# Patient Record
Sex: FEMALE | Race: WHITE | NOT HISPANIC OR LATINO | Employment: UNEMPLOYED | URBAN - METROPOLITAN AREA
[De-identification: names, ages, dates, MRNs, and addresses within clinical notes are randomized per-mention and may not be internally consistent; named-entity substitution may affect disease eponyms.]

---

## 2019-04-15 ENCOUNTER — APPOINTMENT (OUTPATIENT)
Dept: ADMISSIONS | Facility: MEDICAL CENTER | Age: 59
End: 2019-04-15
Attending: INTERNAL MEDICINE
Payer: MEDICAID

## 2019-04-18 ENCOUNTER — ANESTHESIA (OUTPATIENT)
Dept: SURGERY | Facility: MEDICAL CENTER | Age: 59
End: 2019-04-18
Payer: MEDICAID

## 2019-04-18 ENCOUNTER — HOSPITAL ENCOUNTER (OUTPATIENT)
Facility: MEDICAL CENTER | Age: 59
End: 2019-04-18
Attending: INTERNAL MEDICINE | Admitting: INTERNAL MEDICINE
Payer: MEDICAID

## 2019-04-18 ENCOUNTER — ANESTHESIA EVENT (OUTPATIENT)
Dept: SURGERY | Facility: MEDICAL CENTER | Age: 59
End: 2019-04-18
Payer: MEDICAID

## 2019-04-18 ENCOUNTER — APPOINTMENT (OUTPATIENT)
Dept: RADIOLOGY | Facility: MEDICAL CENTER | Age: 59
End: 2019-04-18
Attending: INTERNAL MEDICINE
Payer: MEDICAID

## 2019-04-18 VITALS
OXYGEN SATURATION: 98 % | TEMPERATURE: 97.1 F | HEIGHT: 65 IN | WEIGHT: 129.19 LBS | DIASTOLIC BLOOD PRESSURE: 72 MMHG | BODY MASS INDEX: 21.52 KG/M2 | SYSTOLIC BLOOD PRESSURE: 112 MMHG | RESPIRATION RATE: 18 BRPM

## 2019-04-18 PROCEDURE — 700111 HCHG RX REV CODE 636 W/ 250 OVERRIDE (IP): Performed by: ANESTHESIOLOGY

## 2019-04-18 PROCEDURE — 700101 HCHG RX REV CODE 250: Performed by: INTERNAL MEDICINE

## 2019-04-18 PROCEDURE — 160048 HCHG OR STATISTICAL LEVEL 1-5: Performed by: INTERNAL MEDICINE

## 2019-04-18 PROCEDURE — 160036 HCHG PACU - EA ADDL 30 MINS PHASE I: Performed by: INTERNAL MEDICINE

## 2019-04-18 PROCEDURE — 700105 HCHG RX REV CODE 258: Performed by: ANESTHESIOLOGY

## 2019-04-18 PROCEDURE — 160046 HCHG PACU - 1ST 60 MINS PHASE II: Performed by: INTERNAL MEDICINE

## 2019-04-18 PROCEDURE — 160025 RECOVERY II MINUTES (STATS): Performed by: INTERNAL MEDICINE

## 2019-04-18 PROCEDURE — 160009 HCHG ANES TIME/MIN: Performed by: INTERNAL MEDICINE

## 2019-04-18 PROCEDURE — 160208 HCHG ENDO MINUTES - EA ADDL 1 MIN LEVEL 4: Performed by: INTERNAL MEDICINE

## 2019-04-18 PROCEDURE — 500066 HCHG BITE BLOCK, ECT: Performed by: INTERNAL MEDICINE

## 2019-04-18 PROCEDURE — 160035 HCHG PACU - 1ST 60 MINS PHASE I: Performed by: INTERNAL MEDICINE

## 2019-04-18 PROCEDURE — 700101 HCHG RX REV CODE 250: Performed by: ANESTHESIOLOGY

## 2019-04-18 PROCEDURE — 160203 HCHG ENDO MINUTES - 1ST 30 MINS LEVEL 4: Performed by: INTERNAL MEDICINE

## 2019-04-18 PROCEDURE — 700105 HCHG RX REV CODE 258: Performed by: INTERNAL MEDICINE

## 2019-04-18 PROCEDURE — 160002 HCHG RECOVERY MINUTES (STAT): Performed by: INTERNAL MEDICINE

## 2019-04-18 RX ORDER — DEXAMETHASONE SODIUM PHOSPHATE 4 MG/ML
INJECTION, SOLUTION INTRA-ARTICULAR; INTRALESIONAL; INTRAMUSCULAR; INTRAVENOUS; SOFT TISSUE PRN
Status: DISCONTINUED | OUTPATIENT
Start: 2019-04-18 | End: 2019-04-18 | Stop reason: SURG

## 2019-04-18 RX ORDER — MINOCYCLINE HYDROCHLORIDE 100 MG/1
100 TABLET ORAL EVERY MORNING
COMMUNITY

## 2019-04-18 RX ORDER — ONDANSETRON 2 MG/ML
4 INJECTION INTRAMUSCULAR; INTRAVENOUS
Status: COMPLETED | OUTPATIENT
Start: 2019-04-18 | End: 2019-04-18

## 2019-04-18 RX ORDER — HALOPERIDOL 5 MG/ML
1 INJECTION INTRAMUSCULAR
Status: DISCONTINUED | OUTPATIENT
Start: 2019-04-18 | End: 2019-04-18 | Stop reason: HOSPADM

## 2019-04-18 RX ORDER — SODIUM CHLORIDE, SODIUM LACTATE, POTASSIUM CHLORIDE, CALCIUM CHLORIDE 600; 310; 30; 20 MG/100ML; MG/100ML; MG/100ML; MG/100ML
INJECTION, SOLUTION INTRAVENOUS CONTINUOUS
Status: DISCONTINUED | OUTPATIENT
Start: 2019-04-18 | End: 2019-04-18 | Stop reason: HOSPADM

## 2019-04-18 RX ORDER — DIPHENHYDRAMINE HYDROCHLORIDE 50 MG/ML
12.5 INJECTION INTRAMUSCULAR; INTRAVENOUS
Status: DISCONTINUED | OUTPATIENT
Start: 2019-04-18 | End: 2019-04-18 | Stop reason: HOSPADM

## 2019-04-18 RX ORDER — ONDANSETRON 2 MG/ML
INJECTION INTRAMUSCULAR; INTRAVENOUS PRN
Status: DISCONTINUED | OUTPATIENT
Start: 2019-04-18 | End: 2019-04-18 | Stop reason: SURG

## 2019-04-18 RX ORDER — MEPERIDINE HYDROCHLORIDE 25 MG/ML
6.25 INJECTION INTRAMUSCULAR; INTRAVENOUS; SUBCUTANEOUS
Status: DISCONTINUED | OUTPATIENT
Start: 2019-04-18 | End: 2019-04-18 | Stop reason: HOSPADM

## 2019-04-18 RX ADMIN — SODIUM CHLORIDE, POTASSIUM CHLORIDE, SODIUM LACTATE AND CALCIUM CHLORIDE: 600; 310; 30; 20 INJECTION, SOLUTION INTRAVENOUS at 09:43

## 2019-04-18 RX ADMIN — ONDANSETRON 4 MG: 2 INJECTION INTRAMUSCULAR; INTRAVENOUS at 09:10

## 2019-04-18 RX ADMIN — SODIUM CHLORIDE, POTASSIUM CHLORIDE, SODIUM LACTATE AND CALCIUM CHLORIDE: 600; 310; 30; 20 INJECTION, SOLUTION INTRAVENOUS at 08:16

## 2019-04-18 RX ADMIN — PROPOFOL 150 MG: 10 INJECTION, EMULSION INTRAVENOUS at 09:00

## 2019-04-18 RX ADMIN — SUCCINYLCHOLINE CHLORIDE 60 MG: 20 INJECTION, SOLUTION INTRAMUSCULAR; INTRAVENOUS at 09:00

## 2019-04-18 RX ADMIN — LIDOCAINE HYDROCHLORIDE 0.5 ML: 10 INJECTION, SOLUTION EPIDURAL; INFILTRATION; INTRACAUDAL; PERINEURAL at 08:10

## 2019-04-18 RX ADMIN — ONDANSETRON 4 MG: 2 INJECTION INTRAMUSCULAR; INTRAVENOUS at 09:42

## 2019-04-18 RX ADMIN — DEXAMETHASONE SODIUM PHOSPHATE 4 MG: 4 INJECTION, SOLUTION INTRA-ARTICULAR; INTRALESIONAL; INTRAMUSCULAR; INTRAVENOUS; SOFT TISSUE at 09:10

## 2019-04-18 RX ADMIN — MEPERIDINE HYDROCHLORIDE 6.25 MG: 25 INJECTION INTRAMUSCULAR; INTRAVENOUS; SUBCUTANEOUS at 09:58

## 2019-04-18 RX ADMIN — MEPERIDINE HYDROCHLORIDE 6.25 MG: 25 INJECTION INTRAMUSCULAR; INTRAVENOUS; SUBCUTANEOUS at 09:50

## 2019-04-18 ASSESSMENT — PAIN SCALES - GENERAL: PAIN_LEVEL: 0

## 2019-04-18 NOTE — ANESTHESIA POSTPROCEDURE EVALUATION
Patient: Halina Abbott    Procedure Summary     Date:  04/18/19 Room / Location:  [place] [id number] / SURGERY [facility]    Anesthesia Start:  0852 Anesthesia Stop:  0929    Procedure:  ERCP (ENDOSCOPIC RETROGRADE CHOLANGIOPANCREATOGRAPHY) (N/A Mouth) Diagnosis:  (CBD stone)    Surgeon:  Donald Cristina M.D. Responsible Provider:  Rajan Palacios M.D.    Anesthesia Type:  general ASA Status:  2          Final Anesthesia Type: general  Last vitals  BP   Blood Pressure: 126/86, NIBP: 113/72    Temp   36.1 °C (97 °F)    Pulse   Heart Rate (Monitored): (!) 58   Resp   18    SpO2   99 %      Anesthesia Post Evaluation    Patient location during evaluation: PACU  Patient participation: complete - patient participated  Level of consciousness: awake and alert  Pain score: 0    Airway patency: patent  Anesthetic complications: no  Cardiovascular status: hemodynamically stable  Respiratory status: acceptable  Hydration status: euvolemic    PONV: none           Nurse Pain Score: 0 (NPRS)

## 2019-04-18 NOTE — PROGRESS NOTES
Pt's VSS, pt still in recliner, pt declining any water/clear liquids, she states she wants to wait until she is home

## 2019-04-18 NOTE — PROCEDURES
DATE OF SERVICE:  04/18/2019    PROCEDURES:  Endoscopic retrograde cholangiopancreatography with:  1.  Biliary sphincterotomy.  2.  Bile duct balloon stone extraction.  3.  Radiologic interpretation of static and dynamic fluoroscopic images by   myself, at no time was radiologist present in the room.    INDICATION:  Choledocholithiasis with elevated liver enzymes.    FINAL IMPRESSION:  1.  Biliary ampulla, unremarkable appearance.  2.  Pancreatic duct neither injected nor cannulated even once.  3.  Common bile duct cannulated on first attempt with wire.  Normal course.  A   12 mm diameter.  Two large stones seen, estimated at 10 mm x 15 mm and 10 mm   x 12 mm.    THERAPEUTICS:  1.  A 10 mm biliary sphincterotomy performed with bow papillotome over covered   wire.  2.  Bile duct balloon stone extraction productive of 2 large black stones,   sludge and debris.    RECOMMENDATIONS:  1.  Follow liver enzymes.  2.  Clear diet today, then advance tomorrow as tolerated.  3.  [facility]    DESCRIPTION OF PROCEDURE:  Prior to the procedure, physical exam was stable.    During procedure, vital signs remained within normal limits.  Prior to   sedation, informed consent was obtained.  Risks, benefits, alternatives   including but not limited to risk of bleeding, infection, perforation, adverse   reaction to medication, failure to identify pathology, pancreatitis and death   explained at length to the patient, she accepted all risks.  Patient was   prepped in the prone position after intubation and sedation by anesthesia.    Scope tip of the Olympus flexible side viewing TJF duodenoscope passed to   level of the biliary ampulla in the short position after the gastric pool was   suctioned dry.  Biliary ampulla was unremarkable in appearance.  [place]   papillotome with a covered wire was utilized for selective cannulation of the   common bile duct, this was achieved on the very first attempt and the  wire   passed along the expected course of the bile duct.  At no time was the   pancreatic duct injected, cannulated, or otherwise manipulated.  Initially,   aspiration was positive for bile.  Initially, injection of contrast showed a   low cystic duct takeoff and the wire was in the cystic duct.  The wire was   repositioned in the common bile duct.  Further injection of contrast   demonstrated dilated bile duct throughout its extrahepatic course, the   intrahepatic biliary tree was unremarkable.  In the distal duct, there were 2   stones estimated at 10 mm x 12 mm and 10 mm x 15 mm.  There was no evidence of   mass.  Wire was left in place in the left intrahepatic biliary tree and a 10   mm biliary sphincterotomy was performed with the bow papillotome over covered   wire in the usual fashion.  Sludge and small stone debris was seen flowing   from the duct.  The wire was left in place and a 9-12 mm extraction balloon   was exchanged over the wire and passed between the 2 large stones.  This was   inflated to 12 mm and pulled down the duct with delivery of one of the stones.    The balloon pulled easily through the sphincterotomy site.  The balloon was   deflated and passed proximal to the second stone, inflated in a similar   manner, the stone was extracted.  Further sweeps x3 were made from the common   hepatic duct, first sweep was productive of small stone debris and sludge, but   an additional 2 sweeps were completely negative.  Repeat cholangiogram was   unremarkable.  Procedure was deemed complete.  The scope was withdrawn.  Air   and liquid were suctioned.  Patient tolerated the procedure well and was sent   to recovery without immediate complications.       ____________________________________     MD JOSELIN ARREOLA / VANE    DD:  04/18/2019 09:31:08  DT:  04/18/2019 09:48:42    D#:  [id number]  Job#:  [id number]

## 2019-04-18 NOTE — OR NURSING
Pt A&OX4. VSS. Pt on room air. Pt has no complaints of pain. IV Zofran administered for nausea, resolved. Pt declined sips of water. IV Demerol administered for shivering, resolved. No scripts. Voicemail left for pt's Fabiana hernandez. Report called to GAIL Gunn. Pt out of PACU to Phase II via amadeo.

## 2019-04-18 NOTE — ANESTHESIA TIME REPORT
Anesthesia Start and Stop Event Times     Date Time Event    4/18/2019 0852 Anesthesia Start     0929 Anesthesia Stop        Responsible Staff  04/18/19    Name Role Begin End    Rajan Palacios M.D. Anesth 0852 0996        Preop Diagnosis (Free Text):  Pre-op Diagnosis     CALCULUS OF BILE DUCT WITHOUT CHOLANGITIS OR CHOLECYSTITIS        Preop Diagnosis (Codes):  Diagnosis Information     Diagnosis Code(s):         Post op Diagnosis  Calculus of bile duct without cholangitis or cholecystitis      Premium Reason  Non-Premium    Comments:

## 2019-04-18 NOTE — ANESTHESIA QCDR
2019 [facility] Clinical Data Registry (for Quality Improvement)     Postoperative nausea/vomiting risk protocol (Adult = 18 yrs and Pediatric 3-17 yrs)- (430 and 463)  General inhalation anesthetic (NOT TIVA) with PONV risk factors: Yes  Provision of anti-emetic therapy with at least 2 different classes of agents: Yes   Patient DID NOT receive anti-emetic therapy and reason is documented in Medical Record:  N/A    Multimodal Pain Management- (AQI59)  Patient undergoing Elective Surgery (i.e. Outpatient, or ASC, or Prescheduled Surgery prior to Hospital Admission): No  Use of Multimodal Pain Management, two or more drugs and/or interventions, NOT including systemic opioids: N/A  Exception: Documented allergy to multiple classes of analgesics: N/A    PACU assessment of acute postoperative pain prior to Anesthesia Care End- Applies to Patients Age = 18- (ABG7)  Initial PACU pain score is which of the following: < 7/10  Patient unable to report pain score: N/A    Post-anesthetic transfer of care checklist/protocol to PACU/ICU- (426 and 427)  Upon conclusion of case, patient transferred to which of the following locations: PACU/Non-ICU  Use of transfer checklist/protocol: Yes  Exclusion: Service Performed in Patient Hospital Room (and thus did not require transfer): N/A    PACU Reintubation- (AQI31)  General anesthesia requiring endotracheal intubation (ETT) along with subsequent extubation in OR or PACU: Yes  Required reintubation in the PACU: No   Extubation was a planned trial documented in the medical record prior to removal of the original airway device:  N/A    Unplanned admission to ICU related to anesthesia service up through end of PACU care- (MD51)  Unplanned admission to ICU (not initially anticipated at anesthesia start time): No

## 2019-04-18 NOTE — ANESTHESIA PROCEDURE NOTES
Airway  Date/Time: 4/18/2019 9:00 AM  Performed by: MERLENE DE LOS SANTOS  Authorized by: MERLENE DE LOS SANTOS     Location:  OR  Urgency:  Elective  Indications for Airway Management:  Anesthesia  Spontaneous Ventilation: absent    Sedation Level:  Deep  Preoxygenated: Yes    Patient Position:  Sniffing  Mask Difficulty Assessment:  0 - not attempted  Final Airway Type:  Endotracheal airway  Final Endotracheal Airway:  ETT  Cuffed: Yes    Technique Used for Successful ETT Placement:  Video laryngoscopy  Insertion Site:  Oral  Blade Type:  Marta (GS3)  Laryngoscope Blade/Videolaryngoscope Blade Size:  3  ETT Size (mm):  7.0  Measured from:  Teeth  ETT to Teeth (cm):  20  Placement Verified by: auscultation and capnometry    Cormack-Lehane Classification:  Grade I - full view of glottis  Number of Attempts at Approach:  1   Teeth intact, chose GS given extensive front teeth damage and risk

## 2019-04-18 NOTE — ANESTHESIA PREPROCEDURE EVALUATION
Relevant Problems   (+) Anxiety   (+) Depression   (+) Fibromyalgia   (+) Lumbar disc disease   (+) Migraines   (+) Peptic ulcer disease       Physical Exam    Airway   Mallampati: II  TM distance: >3 FB  Neck ROM: full       Cardiovascular - normal exam  Rhythm: regular  Rate: normal  (-) murmur     Dental - normal exam        Very poor dentition   Pulmonary - normal exam  Breath sounds clear to auscultation     Abdominal    Neurological - normal exam                 Anesthesia Plan    ASA 2       Plan - general       Airway plan will be ETT        Induction: intravenous      Pertinent diagnostic labs and testing reviewed    Informed Consent:    Anesthetic plan and risks discussed with patient.

## 2019-04-18 NOTE — PROGRESS NOTES
Pre op assessment complete, pt's VSS, pt updated on POC. Call light within reach, pt denies any other needs at this time. Pt given blanket and turned on bill paws, lights lowered, no further needs requested at this time

## 2019-04-18 NOTE — PROGRESS NOTES
"Pt ambulated to bathroom and voided, pt has steady gait but requested RN assistance \"just in case\"  "

## 2019-04-18 NOTE — OR SURGEON
Immediate Post OP Note    PreOp Diagnosis: Bile duct obstruction    PostOp Diagnosis: Same - CBD stones    Procedure(s):  ERCP (ENDOSCOPIC RETROGRADE CHOLANGIOPANCREATOGRAPHY) - Wound Class: Clean Contaminated    Surgeon(s):  Donald Cristina M.D.    Anesthesiologist/Type of Anesthesia:  Anesthesiologist: Rajan Palacios M.D./General    Surgical Staff:  Circulator: Marguerite Argueta R.N.  Endoscopy Technician: Jennifer Rincon  Radiology Technologist: Tanya Galvin    Specimens removed if any:  * No specimens in log *    Dr. Cristina  [facility]  [place]  [phone]    ERCP with: Biliary sphincterotomy    Bile duct balloon stone extraction    Radiologic interpretation of static and dynamic fluoroscopic images    Indication: Choledocolithiasis    Sedation: GA (Garol)    Findings:   ERCP    Ampulla     Unremarkable    Pancreatic duct     Neither injected nor cannulated    CBD     Cannulated on 1st attempt with wire     Normal course     12mm diameter     2 stones - 10mm x 15mm, 10mm x 12mm     Therapeutics    10mm biliary sphincterotomy with bow papillatome over covered wire    Bile duct balloon stone extraction - 2 large black stones    Plan:  Follow liver enzymes    Clear diet today and advance tomorrow as tolerated    Follow up in clinic with Dr. Gee      4/18/2019 9:23 AM Donald Cristina M.D.

## 2019-04-18 NOTE — PROGRESS NOTES
Pt verbalizing request to go home, pt's VSS.     Discharge instructions discussed with patient, all questions answered. Discharge instructions, prescriptions and personal belongings with patient.

## 2019-04-18 NOTE — DISCHARGE INSTRUCTIONS
ACTIVITY: Rest and take it easy for the first 24 hours.  A responsible adult is recommended to remain with you during that time.  It is normal to feel sleepy.  We encourage you to not do anything that requires balance, judgment or coordination.    MILD FLU-LIKE SYMPTOMS ARE NORMAL. YOU MAY EXPERIENCE GENERALIZED MUSCLE ACHES, THROAT IRRITATION, HEADACHE AND/OR SOME NAUSEA.    FOR 24 HOURS DO NOT:  Drive, operate machinery or run household appliances.  Drink beer or alcoholic beverages.   Make important decisions or sign legal documents.    SPECIAL INSTRUCTIONS:  Patient not to drive today.   Resume pre procedural diet.    DIET: To avoid nausea, slowly advance diet as tolerated, avoiding spicy or greasy foods for the first day.  Add more substantial food to your diet according to your physician's instructions. INCREASE FLUIDS AND FIBER TO AVOID CONSTIPATION.    SURGICAL DRESSING/BATHING:  No restrictions on bathing    FOLLOW-UP APPOINTMENT:  A follow-up appointment should be arranged with your doctor in 1-2 weeks; call to schedule.    You should CALL YOUR PHYSICIAN if you develop:  Fever greater than 101 degrees F.  Pain not relieved by medication, or persistent nausea or vomiting.  Excessive bleeding (blood soaking through dressing) or unexpected drainage from the wound.  Extreme redness or swelling around the incision site, drainage of pus or foul smelling drainage.  Inability to urinate or empty your bladder within 8 hours.  Problems with breathing or chest pain.    You should call 911 if you develop problems with breathing or chest pain.  If you are unable to contact your doctor or surgical center, you should go to the nearest emergency room or urgent care center.  Physician's telephone #: Dr. Cristina [phone]    If any questions arise, call your doctor.  If your doctor is not available, please feel free to call the Surgical Center at [phone].  The Center is open Monday through Friday from 7AM to 7PM.   You can also call the HEALTH HOTLINE open 24 hours/day, 7 days/week and speak to a nurse at [phone], or toll free at [phone].    A registered nurse may call you a few days after your surgery to see how you are doing after your procedure.    MEDICATIONS: Resume taking daily medication.  Take prescribed pain medication with food.  If no medication is prescribed, you may take non-aspirin pain medication if needed.  PAIN MEDICATION CAN BE VERY CONSTIPATING.  Take a stool softener or laxative such as senokot, pericolace, or milk of magnesia if needed.    No prescriptions prescribed.  No pain medication given in recovery.    If your physician has prescribed pain medication that includes Acetaminophen (Tylenol), do not take additional Acetaminophen (Tylenol) while taking the prescribed medication.    Depression / Suicide Risk    As you are discharged from this [facility] facility, it is important to learn how to keep safe from harming yourself.    Recognize the warning signs:  · Abrupt changes in personality, positive or negative- including increase in energy   · Giving away possessions  · Change in eating patterns- significant weight changes-  positive or negative  · Change in sleeping patterns- unable to sleep or sleeping all the time   · Unwillingness or inability to communicate  · Depression  · Unusual sadness, discouragement and loneliness  · Talk of wanting to die  · Neglect of personal appearance   · Rebelliousness- reckless behavior  · Withdrawal from people/activities they love  · Confusion- inability to concentrate     If you or a loved one observes any of these behaviors or has concerns about self-harm, here's what you can do:  · Talk about it- your feelings and reasons for harming yourself  · Remove any means that you might use to hurt yourself (examples: pills, rope, extension cords, firearm)  · Get professional help from the community (Mental Health, Substance Abuse, psychological  counseling)  · Do not be alone:Call your Safe Contact- someone whom you trust who will be there for you.  · Call your local CRISIS HOTLINE 049-6168 or 510-082-9934  · Call your local Children's Mobile Crisis Response Team [place] (174) 305-4193 or www.SCYFIX  · Call the toll free National Suicide Prevention Hotlines   · National Suicide Prevention Lifeline 534-426-UMGJ (3062)  · National Hope Line Network 800-SUICIDE (143-8395)

## 2020-04-13 ENCOUNTER — HOSPITAL ENCOUNTER (INPATIENT)
Dept: HOSPITAL 8 - ED | Age: 60
LOS: 15 days | Discharge: HOME | DRG: 241 | End: 2020-04-28
Attending: FAMILY MEDICINE | Admitting: INTERNAL MEDICINE
Payer: MEDICAID

## 2020-04-13 VITALS — WEIGHT: 166.23 LBS | BODY MASS INDEX: 27.7 KG/M2 | HEIGHT: 65 IN

## 2020-04-13 VITALS — DIASTOLIC BLOOD PRESSURE: 53 MMHG | SYSTOLIC BLOOD PRESSURE: 89 MMHG

## 2020-04-13 VITALS — SYSTOLIC BLOOD PRESSURE: 68 MMHG | DIASTOLIC BLOOD PRESSURE: 32 MMHG

## 2020-04-13 VITALS — SYSTOLIC BLOOD PRESSURE: 89 MMHG | DIASTOLIC BLOOD PRESSURE: 52 MMHG

## 2020-04-13 VITALS — SYSTOLIC BLOOD PRESSURE: 89 MMHG | DIASTOLIC BLOOD PRESSURE: 53 MMHG

## 2020-04-13 VITALS — DIASTOLIC BLOOD PRESSURE: 35 MMHG | SYSTOLIC BLOOD PRESSURE: 78 MMHG

## 2020-04-13 VITALS — DIASTOLIC BLOOD PRESSURE: 34 MMHG | SYSTOLIC BLOOD PRESSURE: 74 MMHG

## 2020-04-13 VITALS — SYSTOLIC BLOOD PRESSURE: 83 MMHG | DIASTOLIC BLOOD PRESSURE: 43 MMHG

## 2020-04-13 VITALS — SYSTOLIC BLOOD PRESSURE: 84 MMHG | DIASTOLIC BLOOD PRESSURE: 37 MMHG

## 2020-04-13 VITALS — SYSTOLIC BLOOD PRESSURE: 90 MMHG | DIASTOLIC BLOOD PRESSURE: 54 MMHG

## 2020-04-13 VITALS — SYSTOLIC BLOOD PRESSURE: 85 MMHG | DIASTOLIC BLOOD PRESSURE: 58 MMHG

## 2020-04-13 VITALS — SYSTOLIC BLOOD PRESSURE: 85 MMHG | DIASTOLIC BLOOD PRESSURE: 50 MMHG

## 2020-04-13 VITALS — SYSTOLIC BLOOD PRESSURE: 72 MMHG | DIASTOLIC BLOOD PRESSURE: 39 MMHG

## 2020-04-13 VITALS — DIASTOLIC BLOOD PRESSURE: 37 MMHG | SYSTOLIC BLOOD PRESSURE: 80 MMHG

## 2020-04-13 DIAGNOSIS — K70.31: ICD-10-CM

## 2020-04-13 DIAGNOSIS — K75.4: ICD-10-CM

## 2020-04-13 DIAGNOSIS — R57.1: ICD-10-CM

## 2020-04-13 DIAGNOSIS — I85.10: ICD-10-CM

## 2020-04-13 DIAGNOSIS — N17.0: ICD-10-CM

## 2020-04-13 DIAGNOSIS — E87.1: ICD-10-CM

## 2020-04-13 DIAGNOSIS — D68.9: ICD-10-CM

## 2020-04-13 DIAGNOSIS — L30.9: ICD-10-CM

## 2020-04-13 DIAGNOSIS — K70.11: ICD-10-CM

## 2020-04-13 DIAGNOSIS — E87.6: ICD-10-CM

## 2020-04-13 DIAGNOSIS — Z83.3: ICD-10-CM

## 2020-04-13 DIAGNOSIS — I95.89: ICD-10-CM

## 2020-04-13 DIAGNOSIS — Z98.51: ICD-10-CM

## 2020-04-13 DIAGNOSIS — E43: ICD-10-CM

## 2020-04-13 DIAGNOSIS — D50.9: ICD-10-CM

## 2020-04-13 DIAGNOSIS — H10.029: ICD-10-CM

## 2020-04-13 DIAGNOSIS — D62: ICD-10-CM

## 2020-04-13 DIAGNOSIS — K76.6: ICD-10-CM

## 2020-04-13 DIAGNOSIS — J90: ICD-10-CM

## 2020-04-13 DIAGNOSIS — Z82.49: ICD-10-CM

## 2020-04-13 DIAGNOSIS — K26.4: Primary | ICD-10-CM

## 2020-04-13 DIAGNOSIS — Z79.899: ICD-10-CM

## 2020-04-13 DIAGNOSIS — E27.40: ICD-10-CM

## 2020-04-13 DIAGNOSIS — R56.9: ICD-10-CM

## 2020-04-13 DIAGNOSIS — Z51.5: ICD-10-CM

## 2020-04-13 DIAGNOSIS — L89.152: ICD-10-CM

## 2020-04-13 LAB
<PLATELET ESTIMATE>: ADEQUATE
<PLT MORPHOLOGY>: (no result)
ALBUMIN SERPL-MCNC: 1.4 G/DL (ref 3.4–5)
ALP SERPL-CCNC: 385 U/L (ref 45–117)
ALT SERPL-CCNC: 32 U/L (ref 12–78)
ANION GAP SERPL CALC-SCNC: 10 MMOL/L (ref 5–15)
ANISOCYTOSIS BLD QL SMEAR: (no result)
BAND#(MANUAL): 0.11 X10^3/UL
BILIRUB SERPL-MCNC: 11.9 MG/DL (ref 0.2–1)
CALCIUM SERPL-MCNC: 8 MG/DL (ref 8.5–10.1)
CHLORIDE SERPL-SCNC: 93 MMOL/L (ref 98–107)
CREAT SERPL-MCNC: 1.34 MG/DL (ref 0.55–1.02)
ERYTHROCYTE [DISTWIDTH] IN BLOOD BY AUTOMATED COUNT: 18 % (ref 9.6–15.2)
HYPOCHROMIA BLD QL SMEAR: (no result)
INR PPP: 1.24 (ref 0.93–1.1)
LYMPH#(MANUAL): 0.84 X10^3/UL (ref 1–3.4)
LYMPHS% (MANUAL): 8 % (ref 22–44)
MACROCYTES BLD QL SMEAR: (no result)
MCH RBC QN AUTO: 33.3 PG (ref 27–34.8)
MCHC RBC AUTO-ENTMCNC: 33.3 G/DL (ref 32.4–35.8)
MCV RBC AUTO: 100.1 FL (ref 80–100)
MD: YES
MONOS#(MANUAL): 0.53 X10^3/UL (ref 0.3–2.7)
MONOS% (MANUAL): 5 % (ref 2–9)
NEUTS BAND NFR BLD: 1 % (ref 0–7)
PLATELET # BLD AUTO: 301 X10^3/UL (ref 130–400)
PMV BLD AUTO: 6.9 FL (ref 7.4–10.4)
POLYCHROMASIA BLD QL SMEAR: (no result)
PROT SERPL-MCNC: 4.7 G/DL (ref 6.4–8.2)
PROTHROMBIN TIME: 13.2 SECONDS (ref 9.6–11.5)
RBC # BLD AUTO: 1.55 X10^6/UL (ref 3.82–5.3)
SEG#(MANUAL): 9.03 X10^3/UL (ref 1.8–6.8)
SEGS% (MANUAL): 86 % (ref 42–75)
STOMATOCYTES BLD QL SMEAR: (no result)

## 2020-04-13 PROCEDURE — A9575 INJ GADOTERATE MEGLUMI 0.1ML: HCPCS

## 2020-04-13 PROCEDURE — 93005 ELECTROCARDIOGRAM TRACING: CPT

## 2020-04-13 PROCEDURE — 49083 ABD PARACENTESIS W/IMAGING: CPT

## 2020-04-13 PROCEDURE — 87205 SMEAR GRAM STAIN: CPT

## 2020-04-13 PROCEDURE — 36556 INSERT NON-TUNNEL CV CATH: CPT

## 2020-04-13 PROCEDURE — 30233N1 TRANSFUSION OF NONAUTOLOGOUS RED BLOOD CELLS INTO PERIPHERAL VEIN, PERCUTANEOUS APPROACH: ICD-10-PCS | Performed by: FAMILY MEDICINE

## 2020-04-13 PROCEDURE — P9016 RBC LEUKOCYTES REDUCED: HCPCS

## 2020-04-13 PROCEDURE — 96375 TX/PRO/DX INJ NEW DRUG ADDON: CPT

## 2020-04-13 PROCEDURE — P9047 ALBUMIN (HUMAN), 25%, 50ML: HCPCS

## 2020-04-13 PROCEDURE — 96374 THER/PROPH/DIAG INJ IV PUSH: CPT

## 2020-04-13 PROCEDURE — 82962 GLUCOSE BLOOD TEST: CPT

## 2020-04-13 PROCEDURE — 87040 BLOOD CULTURE FOR BACTERIA: CPT

## 2020-04-13 PROCEDURE — 85018 HEMOGLOBIN: CPT

## 2020-04-13 PROCEDURE — 85610 PROTHROMBIN TIME: CPT

## 2020-04-13 PROCEDURE — 70450 CT HEAD/BRAIN W/O DYE: CPT

## 2020-04-13 PROCEDURE — 87070 CULTURE OTHR SPECIMN AEROBIC: CPT

## 2020-04-13 PROCEDURE — 83605 ASSAY OF LACTIC ACID: CPT

## 2020-04-13 PROCEDURE — 85025 COMPLETE CBC W/AUTO DIFF WBC: CPT

## 2020-04-13 PROCEDURE — 86902 BLOOD TYPE ANTIGEN DONOR EA: CPT

## 2020-04-13 PROCEDURE — 83880 ASSAY OF NATRIURETIC PEPTIDE: CPT

## 2020-04-13 PROCEDURE — C9113 INJ PANTOPRAZOLE SODIUM, VIA: HCPCS

## 2020-04-13 PROCEDURE — 87338 HPYLORI STOOL AG IA: CPT

## 2020-04-13 PROCEDURE — 95812 EEG 41-60 MINUTES: CPT

## 2020-04-13 PROCEDURE — 82140 ASSAY OF AMMONIA: CPT

## 2020-04-13 PROCEDURE — B548ZZA ULTRASONOGRAPHY OF SUPERIOR VENA CAVA, GUIDANCE: ICD-10-PCS | Performed by: FAMILY MEDICINE

## 2020-04-13 PROCEDURE — 86923 COMPATIBILITY TEST ELECTRIC: CPT

## 2020-04-13 PROCEDURE — 02H633Z INSERTION OF INFUSION DEVICE INTO RIGHT ATRIUM, PERCUTANEOUS APPROACH: ICD-10-PCS | Performed by: FAMILY MEDICINE

## 2020-04-13 PROCEDURE — 36415 COLL VENOUS BLD VENIPUNCTURE: CPT

## 2020-04-13 PROCEDURE — 80048 BASIC METABOLIC PNL TOTAL CA: CPT

## 2020-04-13 PROCEDURE — 83690 ASSAY OF LIPASE: CPT

## 2020-04-13 PROCEDURE — 80053 COMPREHEN METABOLIC PANEL: CPT

## 2020-04-13 PROCEDURE — 86900 BLOOD TYPING SEROLOGIC ABO: CPT

## 2020-04-13 PROCEDURE — 89051 BODY FLUID CELL COUNT: CPT

## 2020-04-13 PROCEDURE — 87081 CULTURE SCREEN ONLY: CPT

## 2020-04-13 PROCEDURE — 96361 HYDRATE IV INFUSION ADD-ON: CPT

## 2020-04-13 PROCEDURE — 84300 ASSAY OF URINE SODIUM: CPT

## 2020-04-13 PROCEDURE — 83735 ASSAY OF MAGNESIUM: CPT

## 2020-04-13 PROCEDURE — 86922 COMPATIBILITY TEST ANTIGLOB: CPT

## 2020-04-13 PROCEDURE — 82533 TOTAL CORTISOL: CPT

## 2020-04-13 PROCEDURE — 86870 RBC ANTIBODY IDENTIFICATION: CPT

## 2020-04-13 PROCEDURE — 86850 RBC ANTIBODY SCREEN: CPT

## 2020-04-13 PROCEDURE — 80069 RENAL FUNCTION PANEL: CPT

## 2020-04-13 PROCEDURE — 85014 HEMATOCRIT: CPT

## 2020-04-13 PROCEDURE — 71045 X-RAY EXAM CHEST 1 VIEW: CPT

## 2020-04-13 PROCEDURE — 70553 MRI BRAIN STEM W/O & W/DYE: CPT

## 2020-04-13 RX ADMIN — OCTREOTIDE ACETATE PRN MLS/HR: 500 INJECTION, SOLUTION INTRAVENOUS; SUBCUTANEOUS at 16:22

## 2020-04-13 RX ADMIN — OCTREOTIDE ACETATE ONE MCG: 100 INJECTION, SOLUTION INTRAVENOUS; SUBCUTANEOUS at 15:58

## 2020-04-13 RX ADMIN — CEFTRIAXONE SCH MLS/HR: 1 INJECTION, SOLUTION INTRAVENOUS at 16:25

## 2020-04-13 RX ADMIN — OCTREOTIDE ACETATE ONE MCG: 100 INJECTION, SOLUTION INTRAVENOUS; SUBCUTANEOUS at 16:14

## 2020-04-13 RX ADMIN — OCTREOTIDE ACETATE PRN MLS/HR: 500 INJECTION, SOLUTION INTRAVENOUS; SUBCUTANEOUS at 16:19

## 2020-04-13 RX ADMIN — SODIUM CHLORIDE SCH MLS/HR: 0.9 INJECTION, SOLUTION INTRAVENOUS at 15:22

## 2020-04-13 RX ADMIN — PANTOPRAZOLE SODIUM ONE MLS/HR: 40 INJECTION, POWDER, FOR SOLUTION INTRAVENOUS at 16:22

## 2020-04-13 RX ADMIN — PANTOPRAZOLE SODIUM SCH MLS/HR: 40 INJECTION, POWDER, FOR SOLUTION INTRAVENOUS at 16:38

## 2020-04-13 RX ADMIN — PANTOPRAZOLE SODIUM ONE MLS/HR: 40 INJECTION, POWDER, FOR SOLUTION INTRAVENOUS at 16:00

## 2020-04-13 RX ADMIN — OCTREOTIDE ACETATE PRN MLS/HR: 500 INJECTION, SOLUTION INTRAVENOUS; SUBCUTANEOUS at 16:08

## 2020-04-13 NOTE — NUR
-------------------------------------------------------------------------------

           *** Note undone in [facility] - 04/13/20 at 1733 by EV ***            

-------------------------------------------------------------------------------

REPORT GIVEN TO MARIA TERESA BREAUX.

## 2020-04-13 NOTE — NUR
ERP AT BEDSIDE, UPDATED PT ON POC, INCLUDING TO START CENTRAL LINE, BECAUSE 
MULTIPLE UNSUCCESSFUL IV STARTS. PT LAYING IN BED, AWAKE, AND CALM

## 2020-04-13 NOTE — NUR
PT TOLERATED CENTRAL LINE INSERTION WELL, MEDICATED TO MAR. PT STATES SHE IS 
TIRED. BLOOD STARTED. NURSE AT BEDSIDE.

## 2020-04-14 VITALS — SYSTOLIC BLOOD PRESSURE: 89 MMHG | DIASTOLIC BLOOD PRESSURE: 52 MMHG

## 2020-04-14 LAB
<PLATELET ESTIMATE>: ADEQUATE
<PLT MORPHOLOGY>: (no result)
ALBUMIN SERPL-MCNC: 1.2 G/DL (ref 3.4–5)
ALP SERPL-CCNC: 363 U/L (ref 45–117)
ALT SERPL-CCNC: 31 U/L (ref 12–78)
ANION GAP SERPL CALC-SCNC: 6 MMOL/L (ref 5–15)
ANISOCYTOSIS BLD QL SMEAR: (no result)
BILIRUB SERPL-MCNC: 15.9 MG/DL (ref 0.2–1)
CALCIUM SERPL-MCNC: 7.3 MG/DL (ref 8.5–10.1)
CHLORIDE SERPL-SCNC: 99 MMOL/L (ref 98–107)
CREAT SERPL-MCNC: 1.21 MG/DL (ref 0.55–1.02)
ERYTHROCYTE [DISTWIDTH] IN BLOOD BY AUTOMATED COUNT: 17.2 % (ref 9.6–15.2)
HYPOCHROMIA BLD QL SMEAR: (no result)
INR PPP: 1.21 (ref 0.93–1.1)
LYMPH#(MANUAL): 0.91 X10^3/UL (ref 1–3.4)
LYMPHS% (MANUAL): 9 % (ref 22–44)
MACROCYTES BLD QL SMEAR: (no result)
MCH RBC QN AUTO: 32 PG (ref 27–34.8)
MCHC RBC AUTO-ENTMCNC: 34.1 G/DL (ref 32.4–35.8)
MCV RBC AUTO: 93.8 FL (ref 80–100)
MD: YES
MONOS#(MANUAL): 0.61 X10^3/UL (ref 0.3–2.7)
MONOS% (MANUAL): 6 % (ref 2–9)
PLATELET # BLD AUTO: 241 X10^3/UL (ref 130–400)
PMV BLD AUTO: 6.7 FL (ref 7.4–10.4)
POLYCHROMASIA BLD QL SMEAR: (no result)
PROT SERPL-MCNC: 4.1 G/DL (ref 6.4–8.2)
PROTHROMBIN TIME: 12.9 SECONDS (ref 9.6–11.5)
RBC # BLD AUTO: 2.6 X10^6/UL (ref 3.82–5.3)
SEG#(MANUAL): 8.59 X10^3/UL (ref 1.8–6.8)
SEGS% (MANUAL): 85 % (ref 42–75)
TARGETS BLD QL SMEAR: (no result)

## 2020-04-14 PROCEDURE — 0W3P8ZZ CONTROL BLEEDING IN GASTROINTESTINAL TRACT, VIA NATURAL OR ARTIFICIAL OPENING ENDOSCOPIC: ICD-10-PCS | Performed by: INTERNAL MEDICINE

## 2020-04-14 RX ADMIN — SODIUM CHLORIDE SCH MLS/HR: 0.9 INJECTION, SOLUTION INTRAVENOUS at 05:44

## 2020-04-14 RX ADMIN — DOCUSATE SODIUM 50MG AND SENNOSIDES 8.6MG SCH TAB: 8.6; 5 TABLET, FILM COATED ORAL at 10:11

## 2020-04-14 RX ADMIN — CEFTRIAXONE SCH MLS/HR: 1 INJECTION, SOLUTION INTRAVENOUS at 15:05

## 2020-04-14 RX ADMIN — PANTOPRAZOLE SODIUM SCH MLS/HR: 40 INJECTION, POWDER, FOR SOLUTION INTRAVENOUS at 00:14

## 2020-04-14 RX ADMIN — OCTREOTIDE ACETATE SCH MLS/HR: 500 INJECTION, SOLUTION INTRAVENOUS; SUBCUTANEOUS at 07:17

## 2020-04-14 RX ADMIN — OCTREOTIDE ACETATE SCH MLS/HR: 500 INJECTION, SOLUTION INTRAVENOUS; SUBCUTANEOUS at 05:43

## 2020-04-14 RX ADMIN — SODIUM CHLORIDE SCH MLS/HR: 0.9 INJECTION, SOLUTION INTRAVENOUS at 00:13

## 2020-04-14 RX ADMIN — NOREPINEPHRINE BITARTRATE PRN MLS/HR: 1 INJECTION INTRAVENOUS at 10:03

## 2020-04-14 RX ADMIN — PANTOPRAZOLE SODIUM SCH MLS/HR: 40 INJECTION, POWDER, FOR SOLUTION INTRAVENOUS at 13:27

## 2020-04-14 RX ADMIN — SODIUM CHLORIDE SCH MLS/HR: 0.9 INJECTION, SOLUTION INTRAVENOUS at 13:03

## 2020-04-15 LAB
ALBUMIN SERPL-MCNC: 1.2 G/DL (ref 3.4–5)
ALP SERPL-CCNC: 333 U/L (ref 45–117)
ALT SERPL-CCNC: 30 U/L (ref 12–78)
ANION GAP SERPL CALC-SCNC: 7 MMOL/L (ref 5–15)
BILIRUB SERPL-MCNC: 12.5 MG/DL (ref 0.2–1)
CALCIUM SERPL-MCNC: 7 MG/DL (ref 8.5–10.1)
CHLORIDE SERPL-SCNC: 99 MMOL/L (ref 98–107)
CREAT SERPL-MCNC: 1.42 MG/DL (ref 0.55–1.02)
PROT SERPL-MCNC: 4 G/DL (ref 6.4–8.2)

## 2020-04-15 RX ADMIN — MIDODRINE HYDROCHLORIDE SCH MG: 5 TABLET ORAL at 21:00

## 2020-04-15 RX ADMIN — MIDODRINE HYDROCHLORIDE SCH MG: 5 TABLET ORAL at 16:26

## 2020-04-15 RX ADMIN — PANTOPRAZOLE SODIUM SCH MLS/HR: 40 INJECTION, POWDER, FOR SOLUTION INTRAVENOUS at 01:21

## 2020-04-15 RX ADMIN — PANTOPRAZOLE SODIUM SCH MG: 40 INJECTION, POWDER, FOR SOLUTION INTRAVENOUS at 21:00

## 2020-04-15 RX ADMIN — MIDODRINE HYDROCHLORIDE SCH MG: 5 TABLET ORAL at 08:32

## 2020-04-15 RX ADMIN — PANTOPRAZOLE SODIUM SCH MG: 40 INJECTION, POWDER, FOR SOLUTION INTRAVENOUS at 08:32

## 2020-04-15 RX ADMIN — NOREPINEPHRINE BITARTRATE PRN MLS/HR: 1 INJECTION INTRAVENOUS at 06:08

## 2020-04-15 RX ADMIN — CEFTRIAXONE SCH MLS/HR: 1 INJECTION, SOLUTION INTRAVENOUS at 16:26

## 2020-04-15 RX ADMIN — DOCUSATE SODIUM 50MG AND SENNOSIDES 8.6MG SCH TAB: 8.6; 5 TABLET, FILM COATED ORAL at 08:32

## 2020-04-16 LAB
<PLATELET ESTIMATE>: ADEQUATE
<PLT MORPHOLOGY>: (no result)
ALBUMIN SERPL-MCNC: 2 G/DL (ref 3.4–5)
ALP SERPL-CCNC: 305 U/L (ref 45–117)
ALT SERPL-CCNC: 30 U/L (ref 12–78)
ANION GAP SERPL CALC-SCNC: 6 MMOL/L (ref 5–15)
ANISOCYTOSIS BLD QL SMEAR: (no result)
BAND#(MANUAL): 0.25 X10^3/UL
BILIRUB SERPL-MCNC: 11.7 MG/DL (ref 0.2–1)
CALCIUM SERPL-MCNC: 7.6 MG/DL (ref 8.5–10.1)
CHLORIDE SERPL-SCNC: 102 MMOL/L (ref 98–107)
CREAT SERPL-MCNC: 1.13 MG/DL (ref 0.55–1.02)
EOS#(MANUAL): 0.13 X10^3/UL (ref 0–0.4)
EOS% (MANUAL): 1 % (ref 1–7)
ERYTHROCYTE [DISTWIDTH] IN BLOOD BY AUTOMATED COUNT: 18.1 % (ref 9.6–15.2)
HYPOCHROMIA BLD QL SMEAR: (no result)
LYMPH#(MANUAL): 1.26 X10^3/UL (ref 1–3.4)
LYMPHS% (MANUAL): 10 % (ref 22–44)
MCH RBC QN AUTO: 31.8 PG (ref 27–34.8)
MCHC RBC AUTO-ENTMCNC: 33.1 G/DL (ref 32.4–35.8)
MCV RBC AUTO: 96.2 FL (ref 80–100)
MD: YES
MONOS#(MANUAL): 0.5 X10^3/UL (ref 0.3–2.7)
MONOS% (MANUAL): 4 % (ref 2–9)
NEUTS BAND NFR BLD: 2 % (ref 0–7)
PLATELET # BLD AUTO: 338 X10^3/UL (ref 130–400)
PMV BLD AUTO: 6.2 FL (ref 7.4–10.4)
POLYCHROMASIA BLD QL SMEAR: (no result)
PROT SERPL-MCNC: 4.7 G/DL (ref 6.4–8.2)
RBC # BLD AUTO: 2.56 X10^6/UL (ref 3.82–5.3)
REACTIVE LYMPHS # (MANUAL): 0.13 X10^3/UL (ref 0–0)
REACTIVE LYMPHS % (MANUAL): 1 % (ref 0–0)
SEG#(MANUAL): 10.33 X10^3/UL (ref 1.8–6.8)
SEGS% (MANUAL): 82 % (ref 42–75)
TARGETS BLD QL SMEAR: (no result)

## 2020-04-16 RX ADMIN — PANTOPRAZOLE SODIUM SCH MG: 40 INJECTION, POWDER, FOR SOLUTION INTRAVENOUS at 20:23

## 2020-04-16 RX ADMIN — MIDODRINE HYDROCHLORIDE SCH MG: 5 TABLET ORAL at 16:32

## 2020-04-16 RX ADMIN — NOREPINEPHRINE BITARTRATE PRN MLS/HR: 1 INJECTION INTRAVENOUS at 09:45

## 2020-04-16 RX ADMIN — MIDODRINE HYDROCHLORIDE SCH MG: 5 TABLET ORAL at 20:23

## 2020-04-16 RX ADMIN — DOCUSATE SODIUM 50MG AND SENNOSIDES 8.6MG SCH TAB: 8.6; 5 TABLET, FILM COATED ORAL at 09:05

## 2020-04-16 RX ADMIN — CEFTRIAXONE SCH MLS/HR: 1 INJECTION, SOLUTION INTRAVENOUS at 16:30

## 2020-04-16 RX ADMIN — ALBUMIN (HUMAN) SCH MLS/HR: 25 SOLUTION INTRAVENOUS at 19:38

## 2020-04-16 RX ADMIN — PANTOPRAZOLE SODIUM SCH MG: 40 INJECTION, POWDER, FOR SOLUTION INTRAVENOUS at 09:05

## 2020-04-16 RX ADMIN — ALBUMIN (HUMAN) SCH MLS/HR: 25 SOLUTION INTRAVENOUS at 11:11

## 2020-04-16 RX ADMIN — MIDODRINE HYDROCHLORIDE SCH MG: 5 TABLET ORAL at 09:05

## 2020-04-17 VITALS — DIASTOLIC BLOOD PRESSURE: 56 MMHG | SYSTOLIC BLOOD PRESSURE: 93 MMHG

## 2020-04-17 VITALS — DIASTOLIC BLOOD PRESSURE: 55 MMHG | SYSTOLIC BLOOD PRESSURE: 95 MMHG

## 2020-04-17 VITALS — SYSTOLIC BLOOD PRESSURE: 92 MMHG | DIASTOLIC BLOOD PRESSURE: 55 MMHG

## 2020-04-17 VITALS — SYSTOLIC BLOOD PRESSURE: 89 MMHG | DIASTOLIC BLOOD PRESSURE: 56 MMHG

## 2020-04-17 VITALS — SYSTOLIC BLOOD PRESSURE: 89 MMHG | DIASTOLIC BLOOD PRESSURE: 50 MMHG

## 2020-04-17 VITALS — SYSTOLIC BLOOD PRESSURE: 93 MMHG | DIASTOLIC BLOOD PRESSURE: 51 MMHG

## 2020-04-17 VITALS — SYSTOLIC BLOOD PRESSURE: 82 MMHG | DIASTOLIC BLOOD PRESSURE: 42 MMHG

## 2020-04-17 VITALS — DIASTOLIC BLOOD PRESSURE: 56 MMHG | SYSTOLIC BLOOD PRESSURE: 91 MMHG

## 2020-04-17 VITALS — DIASTOLIC BLOOD PRESSURE: 52 MMHG | SYSTOLIC BLOOD PRESSURE: 92 MMHG

## 2020-04-17 LAB
<PLATELET ESTIMATE>: ADEQUATE
<PLT MORPHOLOGY>: (no result)
ANION GAP SERPL CALC-SCNC: 7 MMOL/L (ref 5–15)
ANISOCYTOSIS BLD QL SMEAR: (no result)
BAND#(MANUAL): 0.07 X10^3/UL
BILIRUB DIRECT SERPL-MCNC: NORMAL MG/DL
CALCIUM SERPL-MCNC: 7.6 MG/DL (ref 8.5–10.1)
CHLORIDE SERPL-SCNC: 105 MMOL/L (ref 98–107)
CREAT SERPL-MCNC: 0.95 MG/DL (ref 0.55–1.02)
EOS#(MANUAL): 0.22 X10^3/UL (ref 0–0.4)
EOS% (MANUAL): 3 % (ref 1–7)
ERYTHROCYTE [DISTWIDTH] IN BLOOD BY AUTOMATED COUNT: 18 % (ref 9.6–15.2)
HYPOCHROMIA BLD QL SMEAR: (no result)
LYMPH#(MANUAL): 0.95 X10^3/UL (ref 1–3.4)
LYMPHS% (MANUAL): 13 % (ref 22–44)
MCH RBC QN AUTO: 31.6 PG (ref 27–34.8)
MCHC RBC AUTO-ENTMCNC: 32.7 G/DL (ref 32.4–35.8)
MCV RBC AUTO: 96.5 FL (ref 80–100)
MD: YES
MONOS#(MANUAL): 0.29 X10^3/UL (ref 0.3–2.7)
MONOS% (MANUAL): 4 % (ref 2–9)
NEUTS BAND NFR BLD: 1 % (ref 0–7)
PLATELET # BLD AUTO: 221 X10^3/UL (ref 130–400)
PMV BLD AUTO: 6.2 FL (ref 7.4–10.4)
POLYCHROMASIA BLD QL SMEAR: (no result)
RBC # BLD AUTO: 2.05 X10^6/UL (ref 3.82–5.3)
SEG#(MANUAL): 5.77 X10^3/UL (ref 1.8–6.8)
SEGS% (MANUAL): 79 % (ref 42–75)
TARGETS BLD QL SMEAR: (no result)

## 2020-04-17 RX ADMIN — PANTOPRAZOLE SODIUM SCH MG: 40 INJECTION, POWDER, FOR SOLUTION INTRAVENOUS at 20:22

## 2020-04-17 RX ADMIN — CIPROFLOXACIN HYDROCHLORIDE SCH DROP: 3 SOLUTION/ DROPS OPHTHALMIC at 20:22

## 2020-04-17 RX ADMIN — ALBUMIN (HUMAN) SCH MLS/HR: 25 SOLUTION INTRAVENOUS at 11:57

## 2020-04-17 RX ADMIN — DOCUSATE SODIUM 50MG AND SENNOSIDES 8.6MG SCH TAB: 8.6; 5 TABLET, FILM COATED ORAL at 08:49

## 2020-04-17 RX ADMIN — CEFTRIAXONE SCH MLS/HR: 1 INJECTION, SOLUTION INTRAVENOUS at 15:47

## 2020-04-17 RX ADMIN — MIDODRINE HYDROCHLORIDE SCH MG: 5 TABLET ORAL at 15:46

## 2020-04-17 RX ADMIN — ALBUMIN (HUMAN) SCH MLS/HR: 25 SOLUTION INTRAVENOUS at 19:41

## 2020-04-17 RX ADMIN — PANTOPRAZOLE SODIUM SCH MG: 40 INJECTION, POWDER, FOR SOLUTION INTRAVENOUS at 08:49

## 2020-04-17 RX ADMIN — ALBUMIN (HUMAN) SCH MLS/HR: 25 SOLUTION INTRAVENOUS at 04:25

## 2020-04-17 RX ADMIN — MIDODRINE HYDROCHLORIDE SCH MG: 5 TABLET ORAL at 08:49

## 2020-04-17 RX ADMIN — CIPROFLOXACIN HYDROCHLORIDE SCH DROP: 3 SOLUTION/ DROPS OPHTHALMIC at 10:39

## 2020-04-17 RX ADMIN — CIPROFLOXACIN HYDROCHLORIDE SCH DROP: 3 SOLUTION/ DROPS OPHTHALMIC at 15:42

## 2020-04-17 RX ADMIN — MIDODRINE HYDROCHLORIDE SCH MG: 5 TABLET ORAL at 20:22

## 2020-04-18 VITALS — SYSTOLIC BLOOD PRESSURE: 92 MMHG | DIASTOLIC BLOOD PRESSURE: 51 MMHG

## 2020-04-18 VITALS — DIASTOLIC BLOOD PRESSURE: 65 MMHG | SYSTOLIC BLOOD PRESSURE: 105 MMHG

## 2020-04-18 LAB
BASOPHILS # BLD AUTO: 0 X10^3/UL (ref 0–0.1)
BASOPHILS NFR BLD AUTO: 0 % (ref 0–1)
EOSINOPHIL # BLD AUTO: 0.08 X10^3/UL (ref 0–0.4)
EOSINOPHIL NFR BLD AUTO: 1 % (ref 1–7)
ERYTHROCYTE [DISTWIDTH] IN BLOOD BY AUTOMATED COUNT: 18.7 % (ref 9.6–15.2)
LYMPHOCYTES # BLD AUTO: 1.97 X10^3/UL (ref 1–3.4)
LYMPHOCYTES NFR BLD AUTO: 22 % (ref 22–44)
MCH RBC QN AUTO: 32 PG (ref 27–34.8)
MCHC RBC AUTO-ENTMCNC: 34 G/DL (ref 32.4–35.8)
MCV RBC AUTO: 93.9 FL (ref 80–100)
MD: NO
MONOCYTES # BLD AUTO: 0.18 X10^3/UL (ref 0.2–0.8)
MONOCYTES NFR BLD AUTO: 2 % (ref 2–9)
NEUTROPHILS # BLD AUTO: 6.95 X10^3/UL (ref 1.8–6.8)
NEUTROPHILS NFR BLD AUTO: 76 % (ref 42–75)
PLATELET # BLD AUTO: 181 X10^3/UL (ref 130–400)
PMV BLD AUTO: 6.3 FL (ref 7.4–10.4)
RBC # BLD AUTO: 2.76 X10^6/UL (ref 3.82–5.3)

## 2020-04-18 RX ADMIN — PANTOPRAZOLE SODIUM SCH MG: 40 INJECTION, POWDER, FOR SOLUTION INTRAVENOUS at 09:39

## 2020-04-18 RX ADMIN — CIPROFLOXACIN HYDROCHLORIDE SCH DROP: 3 SOLUTION/ DROPS OPHTHALMIC at 09:39

## 2020-04-18 RX ADMIN — CIPROFLOXACIN HYDROCHLORIDE SCH DROP: 3 SOLUTION/ DROPS OPHTHALMIC at 20:00

## 2020-04-18 RX ADMIN — HYDROCORTISONE SODIUM SUCCINATE SCH MG: 100 INJECTION, POWDER, FOR SOLUTION INTRAMUSCULAR; INTRAVENOUS at 12:36

## 2020-04-18 RX ADMIN — ALBUMIN (HUMAN) SCH MLS/HR: 25 SOLUTION INTRAVENOUS at 20:00

## 2020-04-18 RX ADMIN — CEFTRIAXONE SCH MLS/HR: 1 INJECTION, SOLUTION INTRAVENOUS at 15:17

## 2020-04-18 RX ADMIN — HYDROCORTISONE SODIUM SUCCINATE SCH MG: 100 INJECTION, POWDER, FOR SOLUTION INTRAMUSCULAR; INTRAVENOUS at 19:56

## 2020-04-18 RX ADMIN — ALBUMIN (HUMAN) SCH MLS/HR: 25 SOLUTION INTRAVENOUS at 12:36

## 2020-04-18 RX ADMIN — MIDODRINE HYDROCHLORIDE SCH MG: 5 TABLET ORAL at 16:01

## 2020-04-18 RX ADMIN — ALBUMIN (HUMAN) SCH MLS/HR: 25 SOLUTION INTRAVENOUS at 03:14

## 2020-04-18 RX ADMIN — HYDROCORTISONE SODIUM SUCCINATE SCH MG: 100 INJECTION, POWDER, FOR SOLUTION INTRAMUSCULAR; INTRAVENOUS at 09:39

## 2020-04-18 RX ADMIN — MIDODRINE HYDROCHLORIDE SCH MG: 5 TABLET ORAL at 20:01

## 2020-04-18 RX ADMIN — MIDODRINE HYDROCHLORIDE SCH MG: 5 TABLET ORAL at 09:39

## 2020-04-18 RX ADMIN — DOCUSATE SODIUM 50MG AND SENNOSIDES 8.6MG SCH TAB: 8.6; 5 TABLET, FILM COATED ORAL at 09:40

## 2020-04-18 RX ADMIN — PANTOPRAZOLE SODIUM SCH MG: 40 INJECTION, POWDER, FOR SOLUTION INTRAVENOUS at 19:56

## 2020-04-18 RX ADMIN — CIPROFLOXACIN HYDROCHLORIDE SCH DROP: 3 SOLUTION/ DROPS OPHTHALMIC at 15:27

## 2020-04-19 VITALS — SYSTOLIC BLOOD PRESSURE: 107 MMHG | DIASTOLIC BLOOD PRESSURE: 63 MMHG

## 2020-04-19 VITALS — DIASTOLIC BLOOD PRESSURE: 62 MMHG | SYSTOLIC BLOOD PRESSURE: 99 MMHG

## 2020-04-19 VITALS — SYSTOLIC BLOOD PRESSURE: 94 MMHG | DIASTOLIC BLOOD PRESSURE: 57 MMHG

## 2020-04-19 VITALS — SYSTOLIC BLOOD PRESSURE: 97 MMHG | DIASTOLIC BLOOD PRESSURE: 62 MMHG

## 2020-04-19 VITALS — DIASTOLIC BLOOD PRESSURE: 74 MMHG | SYSTOLIC BLOOD PRESSURE: 116 MMHG

## 2020-04-19 VITALS — DIASTOLIC BLOOD PRESSURE: 56 MMHG | SYSTOLIC BLOOD PRESSURE: 93 MMHG

## 2020-04-19 LAB
ALBUMIN SERPL-MCNC: 3.5 G/DL (ref 3.4–5)
ALP SERPL-CCNC: 259 U/L (ref 45–117)
ALT SERPL-CCNC: 22 U/L (ref 12–78)
ANION GAP SERPL CALC-SCNC: 8 MMOL/L (ref 5–15)
BASOPHILS # BLD AUTO: 0 X10^3/UL (ref 0–0.1)
BASOPHILS NFR BLD AUTO: 0 % (ref 0–1)
BILIRUB SERPL-MCNC: 15.1 MG/DL (ref 0.2–1)
CALCIUM SERPL-MCNC: 8.3 MG/DL (ref 8.5–10.1)
CELLS COUNTED: 30
CHLORIDE SERPL-SCNC: 106 MMOL/L (ref 98–107)
CREAT SERPL-MCNC: 0.98 MG/DL (ref 0.55–1.02)
EOSINOPHIL # BLD AUTO: 0 X10^3/UL (ref 0–0.4)
EOSINOPHIL NFR BLD AUTO: 0 % (ref 1–7)
ERYTHROCYTE [DISTWIDTH] IN BLOOD BY AUTOMATED COUNT: 19.5 % (ref 9.6–15.2)
LYMPHOCYTES # BLD AUTO: 0.56 X10^3/UL (ref 1–3.4)
LYMPHOCYTES NFR BLD AUTO: 7 % (ref 22–44)
MCH RBC QN AUTO: 31.5 PG (ref 27–34.8)
MCHC RBC AUTO-ENTMCNC: 33.2 G/DL (ref 32.4–35.8)
MCV RBC AUTO: 94.8 FL (ref 80–100)
MD: NO
MONOCYTES # BLD AUTO: 0.05 X10^3/UL (ref 0.2–0.8)
MONOCYTES NFR BLD AUTO: 1 % (ref 2–9)
NEUTROPHILS # BLD AUTO: 7.12 X10^3/UL (ref 1.8–6.8)
NEUTROPHILS NFR BLD AUTO: 92 % (ref 42–75)
PLATELET # BLD AUTO: 177 X10^3/UL (ref 130–400)
PMV BLD AUTO: 6.8 FL (ref 7.4–10.4)
PROT SERPL-MCNC: 5.4 G/DL (ref 6.4–8.2)
RBC # BLD AUTO: 2.92 X10^6/UL (ref 3.82–5.3)

## 2020-04-19 PROCEDURE — 0W9G3ZZ DRAINAGE OF PERITONEAL CAVITY, PERCUTANEOUS APPROACH: ICD-10-PCS | Performed by: RADIOLOGY

## 2020-04-19 RX ADMIN — HYDROCORTISONE SODIUM SUCCINATE SCH MG: 100 INJECTION, POWDER, FOR SOLUTION INTRAMUSCULAR; INTRAVENOUS at 13:55

## 2020-04-19 RX ADMIN — CIPROFLOXACIN HYDROCHLORIDE SCH DROP: 3 SOLUTION/ DROPS OPHTHALMIC at 08:30

## 2020-04-19 RX ADMIN — HYDROCORTISONE SODIUM SUCCINATE SCH MG: 100 INJECTION, POWDER, FOR SOLUTION INTRAMUSCULAR; INTRAVENOUS at 19:59

## 2020-04-19 RX ADMIN — CIPROFLOXACIN HYDROCHLORIDE SCH DROP: 3 SOLUTION/ DROPS OPHTHALMIC at 16:44

## 2020-04-19 RX ADMIN — MIDODRINE HYDROCHLORIDE SCH MG: 5 TABLET ORAL at 09:00

## 2020-04-19 RX ADMIN — ALBUMIN (HUMAN) SCH MLS/HR: 25 SOLUTION INTRAVENOUS at 11:44

## 2020-04-19 RX ADMIN — ALBUMIN (HUMAN) SCH MLS/HR: 25 SOLUTION INTRAVENOUS at 03:50

## 2020-04-19 RX ADMIN — ALBUMIN (HUMAN) SCH MLS/HR: 25 SOLUTION INTRAVENOUS at 19:59

## 2020-04-19 RX ADMIN — CEFTRIAXONE SCH MLS/HR: 1 INJECTION, SOLUTION INTRAVENOUS at 16:44

## 2020-04-19 RX ADMIN — MIDODRINE HYDROCHLORIDE SCH MG: 5 TABLET ORAL at 08:22

## 2020-04-19 RX ADMIN — PANTOPRAZOLE SODIUM SCH MG: 40 TABLET, DELAYED RELEASE ORAL at 20:00

## 2020-04-19 RX ADMIN — DOCUSATE SODIUM 50MG AND SENNOSIDES 8.6MG SCH TAB: 8.6; 5 TABLET, FILM COATED ORAL at 08:23

## 2020-04-19 RX ADMIN — HYDROCORTISONE SODIUM SUCCINATE SCH MG: 100 INJECTION, POWDER, FOR SOLUTION INTRAMUSCULAR; INTRAVENOUS at 08:19

## 2020-04-19 RX ADMIN — HYDROCORTISONE SODIUM SUCCINATE SCH MG: 100 INJECTION, POWDER, FOR SOLUTION INTRAMUSCULAR; INTRAVENOUS at 01:08

## 2020-04-19 RX ADMIN — FUROSEMIDE SCH MG: 20 TABLET ORAL at 09:48

## 2020-04-19 RX ADMIN — PANTOPRAZOLE SODIUM SCH MG: 40 INJECTION, POWDER, FOR SOLUTION INTRAVENOUS at 08:19

## 2020-04-19 RX ADMIN — CIPROFLOXACIN HYDROCHLORIDE SCH DROP: 3 SOLUTION/ DROPS OPHTHALMIC at 20:00

## 2020-04-19 RX ADMIN — MIDODRINE HYDROCHLORIDE SCH MG: 5 TABLET ORAL at 16:44

## 2020-04-19 RX ADMIN — MIDODRINE HYDROCHLORIDE SCH MG: 5 TABLET ORAL at 20:00

## 2020-04-20 VITALS — SYSTOLIC BLOOD PRESSURE: 101 MMHG | DIASTOLIC BLOOD PRESSURE: 63 MMHG

## 2020-04-20 VITALS — SYSTOLIC BLOOD PRESSURE: 110 MMHG | DIASTOLIC BLOOD PRESSURE: 63 MMHG

## 2020-04-20 VITALS — DIASTOLIC BLOOD PRESSURE: 63 MMHG | SYSTOLIC BLOOD PRESSURE: 103 MMHG

## 2020-04-20 VITALS — DIASTOLIC BLOOD PRESSURE: 57 MMHG | SYSTOLIC BLOOD PRESSURE: 90 MMHG

## 2020-04-20 LAB
ALBUMIN SERPL-MCNC: 3.2 G/DL (ref 3.4–5)
ALP SERPL-CCNC: 274 U/L (ref 45–117)
ALT SERPL-CCNC: 19 U/L (ref 12–78)
ANION GAP SERPL CALC-SCNC: 6 MMOL/L (ref 5–15)
BILIRUB SERPL-MCNC: 11.8 MG/DL (ref 0.2–1)
CALCIUM SERPL-MCNC: 8.2 MG/DL (ref 8.5–10.1)
CHLORIDE SERPL-SCNC: 107 MMOL/L (ref 98–107)
CREAT SERPL-MCNC: 1.04 MG/DL (ref 0.55–1.02)
PROT SERPL-MCNC: 4.8 G/DL (ref 6.4–8.2)

## 2020-04-20 RX ADMIN — CIPROFLOXACIN HYDROCHLORIDE SCH DROP: 3 SOLUTION/ DROPS OPHTHALMIC at 21:16

## 2020-04-20 RX ADMIN — SPIRONOLACTONE SCH MG: 25 TABLET ORAL at 10:54

## 2020-04-20 RX ADMIN — ALBUMIN (HUMAN) SCH MLS/HR: 25 SOLUTION INTRAVENOUS at 09:03

## 2020-04-20 RX ADMIN — MIDODRINE HYDROCHLORIDE SCH MG: 5 TABLET ORAL at 15:37

## 2020-04-20 RX ADMIN — PANTOPRAZOLE SODIUM SCH MG: 40 TABLET, DELAYED RELEASE ORAL at 15:37

## 2020-04-20 RX ADMIN — HYDROCORTISONE SODIUM SUCCINATE SCH MG: 100 INJECTION, POWDER, FOR SOLUTION INTRAMUSCULAR; INTRAVENOUS at 09:02

## 2020-04-20 RX ADMIN — MIDODRINE HYDROCHLORIDE SCH MG: 5 TABLET ORAL at 09:02

## 2020-04-20 RX ADMIN — ALBUMIN (HUMAN) SCH MLS/HR: 25 SOLUTION INTRAVENOUS at 03:41

## 2020-04-20 RX ADMIN — CIPROFLOXACIN HYDROCHLORIDE SCH DROP: 3 SOLUTION/ DROPS OPHTHALMIC at 15:37

## 2020-04-20 RX ADMIN — HYDROCORTISONE SODIUM SUCCINATE SCH MG: 100 INJECTION, POWDER, FOR SOLUTION INTRAMUSCULAR; INTRAVENOUS at 21:16

## 2020-04-20 RX ADMIN — PANTOPRAZOLE SODIUM SCH MG: 40 TABLET, DELAYED RELEASE ORAL at 05:28

## 2020-04-20 RX ADMIN — FUROSEMIDE SCH MG: 20 TABLET ORAL at 10:53

## 2020-04-20 RX ADMIN — MIDODRINE HYDROCHLORIDE SCH MG: 5 TABLET ORAL at 21:16

## 2020-04-20 RX ADMIN — CIPROFLOXACIN HYDROCHLORIDE SCH DROP: 3 SOLUTION/ DROPS OPHTHALMIC at 09:03

## 2020-04-20 RX ADMIN — DOCUSATE SODIUM 50MG AND SENNOSIDES 8.6MG SCH TAB: 8.6; 5 TABLET, FILM COATED ORAL at 09:02

## 2020-04-20 RX ADMIN — HYDROCORTISONE SODIUM SUCCINATE SCH MG: 100 INJECTION, POWDER, FOR SOLUTION INTRAMUSCULAR; INTRAVENOUS at 00:16

## 2020-04-20 NOTE — NUR
NURSING ACTIVITY SHEET

1. Bilateral LE strengthening exercises x 10 reps as tolerated per handout: ankle pumps, 
glut sets, quad sets, heel slides

2. Bed up in chair position 2-3x's a day for meals. 

-------------------------------------------------------------------------------

Addendum: 04/20/20 at 1414 by MELQUIADES LÓPEZ PTA

-------------------------------------------------------------------------------

Amended: Links added.

## 2020-04-21 VITALS — DIASTOLIC BLOOD PRESSURE: 67 MMHG | SYSTOLIC BLOOD PRESSURE: 101 MMHG

## 2020-04-21 VITALS — DIASTOLIC BLOOD PRESSURE: 64 MMHG | SYSTOLIC BLOOD PRESSURE: 102 MMHG

## 2020-04-21 VITALS — DIASTOLIC BLOOD PRESSURE: 50 MMHG | SYSTOLIC BLOOD PRESSURE: 93 MMHG

## 2020-04-21 VITALS — DIASTOLIC BLOOD PRESSURE: 60 MMHG | SYSTOLIC BLOOD PRESSURE: 96 MMHG

## 2020-04-21 LAB
ANION GAP SERPL CALC-SCNC: 6 MMOL/L (ref 5–15)
BASOPHILS # BLD AUTO: 0.05 X10^3/UL (ref 0–0.1)
BASOPHILS NFR BLD AUTO: 1 % (ref 0–1)
CALCIUM SERPL-MCNC: 8.1 MG/DL (ref 8.5–10.1)
CHLORIDE SERPL-SCNC: 107 MMOL/L (ref 98–107)
CREAT SERPL-MCNC: 1.08 MG/DL (ref 0.55–1.02)
EOSINOPHIL # BLD AUTO: 0 X10^3/UL (ref 0–0.4)
EOSINOPHIL NFR BLD AUTO: 0 % (ref 1–7)
ERYTHROCYTE [DISTWIDTH] IN BLOOD BY AUTOMATED COUNT: 20.7 % (ref 9.6–15.2)
LYMPHOCYTES # BLD AUTO: 1.01 X10^3/UL (ref 1–3.4)
LYMPHOCYTES NFR BLD AUTO: 9 % (ref 22–44)
MCH RBC QN AUTO: 31 PG (ref 27–34.8)
MCHC RBC AUTO-ENTMCNC: 32.4 G/DL (ref 32.4–35.8)
MCV RBC AUTO: 95.9 FL (ref 80–100)
MD: NO
MONOCYTES # BLD AUTO: 0.47 X10^3/UL (ref 0.2–0.8)
MONOCYTES NFR BLD AUTO: 4 % (ref 2–9)
NEUTROPHILS # BLD AUTO: 9.34 X10^3/UL (ref 1.8–6.8)
NEUTROPHILS NFR BLD AUTO: 86 % (ref 42–75)
PLATELET # BLD AUTO: 214 X10^3/UL (ref 130–400)
PMV BLD AUTO: 7.2 FL (ref 7.4–10.4)
RBC # BLD AUTO: 2.99 X10^6/UL (ref 3.82–5.3)

## 2020-04-21 RX ADMIN — MIDODRINE HYDROCHLORIDE SCH MG: 5 TABLET ORAL at 16:04

## 2020-04-21 RX ADMIN — PANTOPRAZOLE SODIUM SCH MG: 40 TABLET, DELAYED RELEASE ORAL at 06:06

## 2020-04-21 RX ADMIN — PANTOPRAZOLE SODIUM SCH MG: 40 TABLET, DELAYED RELEASE ORAL at 16:04

## 2020-04-21 RX ADMIN — CIPROFLOXACIN HYDROCHLORIDE SCH DROP: 3 SOLUTION/ DROPS OPHTHALMIC at 20:42

## 2020-04-21 RX ADMIN — MIDODRINE HYDROCHLORIDE SCH MG: 5 TABLET ORAL at 08:07

## 2020-04-21 RX ADMIN — SPIRONOLACTONE SCH MG: 25 TABLET ORAL at 08:06

## 2020-04-21 RX ADMIN — MIDODRINE HYDROCHLORIDE SCH MG: 5 TABLET ORAL at 20:41

## 2020-04-21 RX ADMIN — FUROSEMIDE SCH MG: 20 TABLET ORAL at 08:06

## 2020-04-21 RX ADMIN — ALBUMIN (HUMAN) SCH MLS/HR: 25 SOLUTION INTRAVENOUS at 08:06

## 2020-04-21 RX ADMIN — DOCUSATE SODIUM 50MG AND SENNOSIDES 8.6MG SCH TAB: 8.6; 5 TABLET, FILM COATED ORAL at 08:08

## 2020-04-21 RX ADMIN — CIPROFLOXACIN HYDROCHLORIDE SCH DROP: 3 SOLUTION/ DROPS OPHTHALMIC at 16:04

## 2020-04-21 RX ADMIN — CIPROFLOXACIN HYDROCHLORIDE SCH DROP: 3 SOLUTION/ DROPS OPHTHALMIC at 08:10

## 2020-04-22 VITALS — SYSTOLIC BLOOD PRESSURE: 92 MMHG | DIASTOLIC BLOOD PRESSURE: 56 MMHG

## 2020-04-22 VITALS — DIASTOLIC BLOOD PRESSURE: 64 MMHG | SYSTOLIC BLOOD PRESSURE: 102 MMHG

## 2020-04-22 VITALS — DIASTOLIC BLOOD PRESSURE: 68 MMHG | SYSTOLIC BLOOD PRESSURE: 103 MMHG

## 2020-04-22 VITALS — SYSTOLIC BLOOD PRESSURE: 96 MMHG | DIASTOLIC BLOOD PRESSURE: 61 MMHG

## 2020-04-22 VITALS — SYSTOLIC BLOOD PRESSURE: 101 MMHG | DIASTOLIC BLOOD PRESSURE: 63 MMHG

## 2020-04-22 PROCEDURE — 0W9G3ZZ DRAINAGE OF PERITONEAL CAVITY, PERCUTANEOUS APPROACH: ICD-10-PCS | Performed by: RADIOLOGY

## 2020-04-22 RX ADMIN — PANTOPRAZOLE SODIUM SCH MG: 40 TABLET, DELAYED RELEASE ORAL at 05:46

## 2020-04-22 RX ADMIN — DOCUSATE SODIUM 50MG AND SENNOSIDES 8.6MG SCH TAB: 8.6; 5 TABLET, FILM COATED ORAL at 09:00

## 2020-04-22 RX ADMIN — MIDODRINE HYDROCHLORIDE SCH MG: 5 TABLET ORAL at 09:51

## 2020-04-22 RX ADMIN — PANTOPRAZOLE SODIUM SCH MG: 40 TABLET, DELAYED RELEASE ORAL at 17:30

## 2020-04-22 RX ADMIN — ALBUMIN (HUMAN) SCH MLS/HR: 25 SOLUTION INTRAVENOUS at 13:00

## 2020-04-22 RX ADMIN — MIDODRINE HYDROCHLORIDE SCH MG: 5 TABLET ORAL at 20:49

## 2020-04-22 RX ADMIN — SPIRONOLACTONE SCH MG: 25 TABLET ORAL at 09:53

## 2020-04-22 RX ADMIN — FUROSEMIDE SCH MG: 20 TABLET ORAL at 09:51

## 2020-04-22 RX ADMIN — CIPROFLOXACIN HYDROCHLORIDE SCH DROP: 3 SOLUTION/ DROPS OPHTHALMIC at 09:50

## 2020-04-23 VITALS — DIASTOLIC BLOOD PRESSURE: 61 MMHG | SYSTOLIC BLOOD PRESSURE: 96 MMHG

## 2020-04-23 VITALS — DIASTOLIC BLOOD PRESSURE: 65 MMHG | SYSTOLIC BLOOD PRESSURE: 100 MMHG

## 2020-04-23 VITALS — SYSTOLIC BLOOD PRESSURE: 91 MMHG | DIASTOLIC BLOOD PRESSURE: 57 MMHG

## 2020-04-23 VITALS — SYSTOLIC BLOOD PRESSURE: 98 MMHG | DIASTOLIC BLOOD PRESSURE: 62 MMHG

## 2020-04-23 LAB
ALBUMIN SERPL-MCNC: 2.7 G/DL (ref 3.4–5)
ALP SERPL-CCNC: 347 U/L (ref 45–117)
ALT SERPL-CCNC: 37 U/L (ref 12–78)
ANION GAP SERPL CALC-SCNC: 5 MMOL/L (ref 5–15)
BASOPHILS # BLD AUTO: 0.03 X10^3/UL (ref 0–0.1)
BASOPHILS NFR BLD AUTO: 1 % (ref 0–1)
BILIRUB SERPL-MCNC: 12.5 MG/DL (ref 0.2–1)
CALCIUM SERPL-MCNC: 8 MG/DL (ref 8.5–10.1)
CHLORIDE SERPL-SCNC: 109 MMOL/L (ref 98–107)
CREAT SERPL-MCNC: 0.91 MG/DL (ref 0.55–1.02)
EOSINOPHIL # BLD AUTO: 0.12 X10^3/UL (ref 0–0.4)
EOSINOPHIL NFR BLD AUTO: 2 % (ref 1–7)
ERYTHROCYTE [DISTWIDTH] IN BLOOD BY AUTOMATED COUNT: 19.5 % (ref 9.6–15.2)
LYMPHOCYTES # BLD AUTO: 1.27 X10^3/UL (ref 1–3.4)
LYMPHOCYTES NFR BLD AUTO: 20 % (ref 22–44)
MCH RBC QN AUTO: 31.3 PG (ref 27–34.8)
MCHC RBC AUTO-ENTMCNC: 33 G/DL (ref 32.4–35.8)
MCV RBC AUTO: 94.8 FL (ref 80–100)
MD: NO
MONOCYTES # BLD AUTO: 0.26 X10^3/UL (ref 0.2–0.8)
MONOCYTES NFR BLD AUTO: 4 % (ref 2–9)
NEUTROPHILS # BLD AUTO: 4.81 X10^3/UL (ref 1.8–6.8)
NEUTROPHILS NFR BLD AUTO: 74 % (ref 42–75)
PLATELET # BLD AUTO: 192 X10^3/UL (ref 130–400)
PMV BLD AUTO: 7.3 FL (ref 7.4–10.4)
PROT SERPL-MCNC: 4.1 G/DL (ref 6.4–8.2)
RBC # BLD AUTO: 3 X10^6/UL (ref 3.82–5.3)

## 2020-04-23 RX ADMIN — DOCUSATE SODIUM 50MG AND SENNOSIDES 8.6MG SCH TAB: 8.6; 5 TABLET, FILM COATED ORAL at 08:05

## 2020-04-23 RX ADMIN — MIDODRINE HYDROCHLORIDE SCH MG: 5 TABLET ORAL at 08:06

## 2020-04-23 RX ADMIN — MIDODRINE HYDROCHLORIDE SCH MG: 5 TABLET ORAL at 21:10

## 2020-04-23 RX ADMIN — FUROSEMIDE SCH MG: 20 TABLET ORAL at 08:06

## 2020-04-23 RX ADMIN — TRIAMCINOLONE ACETONIDE SCH APPLIC: 1 CREAM TOPICAL at 21:10

## 2020-04-23 RX ADMIN — PANTOPRAZOLE SODIUM SCH MG: 40 TABLET, DELAYED RELEASE ORAL at 15:59

## 2020-04-23 RX ADMIN — PANTOPRAZOLE SODIUM SCH MG: 40 TABLET, DELAYED RELEASE ORAL at 05:27

## 2020-04-23 RX ADMIN — CARBAMIDE PEROXIDE 6.5% OTIC SOLN SCH DROP(S): 6.5 SOLUTION at 12:26

## 2020-04-23 RX ADMIN — SPIRONOLACTONE SCH MG: 25 TABLET ORAL at 08:06

## 2020-04-23 RX ADMIN — ALBUMIN (HUMAN) SCH MLS/HR: 25 SOLUTION INTRAVENOUS at 08:07

## 2020-04-24 VITALS — SYSTOLIC BLOOD PRESSURE: 98 MMHG | DIASTOLIC BLOOD PRESSURE: 58 MMHG

## 2020-04-24 VITALS — DIASTOLIC BLOOD PRESSURE: 58 MMHG | SYSTOLIC BLOOD PRESSURE: 97 MMHG

## 2020-04-24 VITALS — DIASTOLIC BLOOD PRESSURE: 65 MMHG | SYSTOLIC BLOOD PRESSURE: 100 MMHG

## 2020-04-24 VITALS — DIASTOLIC BLOOD PRESSURE: 68 MMHG | SYSTOLIC BLOOD PRESSURE: 107 MMHG

## 2020-04-24 RX ADMIN — PANTOPRAZOLE SODIUM SCH MG: 40 TABLET, DELAYED RELEASE ORAL at 06:14

## 2020-04-24 RX ADMIN — TRIAMCINOLONE ACETONIDE SCH APPLIC: 1 CREAM TOPICAL at 08:08

## 2020-04-24 RX ADMIN — ALBUMIN (HUMAN) SCH MLS/HR: 25 SOLUTION INTRAVENOUS at 08:07

## 2020-04-24 RX ADMIN — FUROSEMIDE SCH MG: 20 TABLET ORAL at 08:09

## 2020-04-24 RX ADMIN — SPIRONOLACTONE SCH MG: 25 TABLET ORAL at 08:10

## 2020-04-24 RX ADMIN — MIDODRINE HYDROCHLORIDE SCH MG: 5 TABLET ORAL at 08:09

## 2020-04-24 RX ADMIN — DOCUSATE SODIUM 50MG AND SENNOSIDES 8.6MG SCH TAB: 8.6; 5 TABLET, FILM COATED ORAL at 08:10

## 2020-04-24 RX ADMIN — MIDODRINE HYDROCHLORIDE SCH MG: 5 TABLET ORAL at 20:25

## 2020-04-24 RX ADMIN — PANTOPRAZOLE SODIUM SCH MG: 40 TABLET, DELAYED RELEASE ORAL at 16:20

## 2020-04-24 RX ADMIN — CARBAMIDE PEROXIDE 6.5% OTIC SOLN SCH DROP(S): 6.5 SOLUTION at 08:08

## 2020-04-24 RX ADMIN — TRIAMCINOLONE ACETONIDE SCH APPLIC: 1 CREAM TOPICAL at 20:25

## 2020-04-25 VITALS — DIASTOLIC BLOOD PRESSURE: 61 MMHG | SYSTOLIC BLOOD PRESSURE: 102 MMHG

## 2020-04-25 VITALS — SYSTOLIC BLOOD PRESSURE: 94 MMHG | DIASTOLIC BLOOD PRESSURE: 57 MMHG

## 2020-04-25 VITALS — SYSTOLIC BLOOD PRESSURE: 99 MMHG | DIASTOLIC BLOOD PRESSURE: 58 MMHG

## 2020-04-25 VITALS — DIASTOLIC BLOOD PRESSURE: 74 MMHG | SYSTOLIC BLOOD PRESSURE: 149 MMHG

## 2020-04-25 VITALS — SYSTOLIC BLOOD PRESSURE: 101 MMHG | DIASTOLIC BLOOD PRESSURE: 60 MMHG

## 2020-04-25 LAB
ANION GAP SERPL CALC-SCNC: 10 MMOL/L (ref 5–15)
CALCIUM SERPL-MCNC: 8.5 MG/DL (ref 8.5–10.1)
CHLORIDE SERPL-SCNC: 107 MMOL/L (ref 98–107)
CREAT SERPL-MCNC: 0.94 MG/DL (ref 0.55–1.02)

## 2020-04-25 RX ADMIN — TRIAMCINOLONE ACETONIDE SCH APPLIC: 1 CREAM TOPICAL at 10:58

## 2020-04-25 RX ADMIN — SPIRONOLACTONE SCH MG: 25 TABLET ORAL at 10:57

## 2020-04-25 RX ADMIN — PANTOPRAZOLE SODIUM SCH MG: 40 TABLET, DELAYED RELEASE ORAL at 16:12

## 2020-04-25 RX ADMIN — CARBAMIDE PEROXIDE 6.5% OTIC SOLN SCH DROP(S): 6.5 SOLUTION at 10:35

## 2020-04-25 RX ADMIN — FUROSEMIDE SCH MG: 20 TABLET ORAL at 10:57

## 2020-04-25 RX ADMIN — MIDODRINE HYDROCHLORIDE SCH MG: 5 TABLET ORAL at 21:06

## 2020-04-25 RX ADMIN — DOCUSATE SODIUM 50MG AND SENNOSIDES 8.6MG SCH TAB: 8.6; 5 TABLET, FILM COATED ORAL at 09:04

## 2020-04-25 RX ADMIN — PANTOPRAZOLE SODIUM SCH MG: 40 TABLET, DELAYED RELEASE ORAL at 05:35

## 2020-04-25 RX ADMIN — ALBUMIN (HUMAN) SCH MLS/HR: 25 SOLUTION INTRAVENOUS at 09:00

## 2020-04-25 RX ADMIN — MIDODRINE HYDROCHLORIDE SCH MG: 5 TABLET ORAL at 09:04

## 2020-04-25 RX ADMIN — TRIAMCINOLONE ACETONIDE SCH APPLIC: 1 CREAM TOPICAL at 21:06

## 2020-04-26 VITALS — SYSTOLIC BLOOD PRESSURE: 101 MMHG | DIASTOLIC BLOOD PRESSURE: 62 MMHG

## 2020-04-26 VITALS — DIASTOLIC BLOOD PRESSURE: 60 MMHG | SYSTOLIC BLOOD PRESSURE: 96 MMHG

## 2020-04-26 VITALS — DIASTOLIC BLOOD PRESSURE: 55 MMHG | SYSTOLIC BLOOD PRESSURE: 89 MMHG

## 2020-04-26 VITALS — SYSTOLIC BLOOD PRESSURE: 94 MMHG | DIASTOLIC BLOOD PRESSURE: 59 MMHG

## 2020-04-26 RX ADMIN — PANTOPRAZOLE SODIUM SCH MG: 40 TABLET, DELAYED RELEASE ORAL at 05:15

## 2020-04-26 RX ADMIN — MIDODRINE HYDROCHLORIDE SCH MG: 5 TABLET ORAL at 09:58

## 2020-04-26 RX ADMIN — MIDODRINE HYDROCHLORIDE SCH MG: 5 TABLET ORAL at 20:26

## 2020-04-26 RX ADMIN — TRIAMCINOLONE ACETONIDE SCH APPLIC: 1 CREAM TOPICAL at 12:46

## 2020-04-26 RX ADMIN — CARBAMIDE PEROXIDE 6.5% OTIC SOLN SCH DROP(S): 6.5 SOLUTION at 11:34

## 2020-04-26 RX ADMIN — LEVETIRACETAM SCH MG: 500 TABLET ORAL at 20:26

## 2020-04-26 RX ADMIN — SPIRONOLACTONE SCH MG: 25 TABLET ORAL at 09:00

## 2020-04-26 RX ADMIN — Medication PRN SPRAY: at 12:37

## 2020-04-26 RX ADMIN — DOCUSATE SODIUM 50MG AND SENNOSIDES 8.6MG SCH TAB: 8.6; 5 TABLET, FILM COATED ORAL at 09:00

## 2020-04-26 RX ADMIN — PANTOPRAZOLE SODIUM SCH MG: 40 TABLET, DELAYED RELEASE ORAL at 17:43

## 2020-04-26 RX ADMIN — TRIAMCINOLONE ACETONIDE SCH APPLIC: 1 CREAM TOPICAL at 20:26

## 2020-04-26 RX ADMIN — FUROSEMIDE SCH MG: 20 TABLET ORAL at 09:00

## 2020-04-26 RX ADMIN — LEVETIRACETAM SCH MG: 500 TABLET ORAL at 14:49

## 2020-04-26 RX ADMIN — ALBUMIN (HUMAN) SCH MLS/HR: 25 SOLUTION INTRAVENOUS at 12:46

## 2020-04-27 VITALS — DIASTOLIC BLOOD PRESSURE: 55 MMHG | SYSTOLIC BLOOD PRESSURE: 93 MMHG

## 2020-04-27 VITALS — SYSTOLIC BLOOD PRESSURE: 92 MMHG | DIASTOLIC BLOOD PRESSURE: 57 MMHG

## 2020-04-27 VITALS — DIASTOLIC BLOOD PRESSURE: 60 MMHG | SYSTOLIC BLOOD PRESSURE: 98 MMHG

## 2020-04-27 VITALS — DIASTOLIC BLOOD PRESSURE: 60 MMHG | SYSTOLIC BLOOD PRESSURE: 91 MMHG

## 2020-04-27 LAB
ALBUMIN SERPL-MCNC: 2.9 G/DL (ref 3.4–5)
ANION GAP SERPL CALC-SCNC: 6 MMOL/L (ref 5–15)
BASOPHILS # BLD AUTO: 0.01 X10^3/UL (ref 0–0.1)
BASOPHILS NFR BLD AUTO: 0 % (ref 0–1)
CALCIUM SERPL-MCNC: 8.1 MG/DL (ref 8.5–10.1)
CHLORIDE SERPL-SCNC: 109 MMOL/L (ref 98–107)
CREAT SERPL-MCNC: 0.63 MG/DL (ref 0.55–1.02)
EOSINOPHIL # BLD AUTO: 0.12 X10^3/UL (ref 0–0.4)
EOSINOPHIL NFR BLD AUTO: 2 % (ref 1–7)
ERYTHROCYTE [DISTWIDTH] IN BLOOD BY AUTOMATED COUNT: 20.3 % (ref 9.6–15.2)
LYMPHOCYTES # BLD AUTO: 1.1 X10^3/UL (ref 1–3.4)
LYMPHOCYTES NFR BLD AUTO: 19 % (ref 22–44)
MCH RBC QN AUTO: 31 PG (ref 27–34.8)
MCHC RBC AUTO-ENTMCNC: 32.2 G/DL (ref 32.4–35.8)
MCV RBC AUTO: 96.1 FL (ref 80–100)
MD: NO
MONOCYTES # BLD AUTO: 0.42 X10^3/UL (ref 0.2–0.8)
MONOCYTES NFR BLD AUTO: 7 % (ref 2–9)
NEUTROPHILS # BLD AUTO: 4.05 X10^3/UL (ref 1.8–6.8)
NEUTROPHILS NFR BLD AUTO: 71 % (ref 42–75)
PLATELET # BLD AUTO: 169 X10^3/UL (ref 130–400)
PMV BLD AUTO: 7.7 FL (ref 7.4–10.4)
RBC # BLD AUTO: 2.67 X10^6/UL (ref 3.82–5.3)

## 2020-04-27 RX ADMIN — MIDODRINE HYDROCHLORIDE SCH MG: 5 TABLET ORAL at 20:43

## 2020-04-27 RX ADMIN — Medication PRN SPRAY: at 17:18

## 2020-04-27 RX ADMIN — ALBUMIN (HUMAN) SCH MLS/HR: 25 SOLUTION INTRAVENOUS at 08:02

## 2020-04-27 RX ADMIN — PANTOPRAZOLE SODIUM SCH MG: 40 TABLET, DELAYED RELEASE ORAL at 05:39

## 2020-04-27 RX ADMIN — SPIRONOLACTONE SCH MG: 25 TABLET ORAL at 08:03

## 2020-04-27 RX ADMIN — PANTOPRAZOLE SODIUM SCH MG: 40 TABLET, DELAYED RELEASE ORAL at 16:34

## 2020-04-27 RX ADMIN — LEVETIRACETAM SCH MG: 500 TABLET ORAL at 20:42

## 2020-04-27 RX ADMIN — TRIAMCINOLONE ACETONIDE SCH APPLIC: 1 CREAM TOPICAL at 08:03

## 2020-04-27 RX ADMIN — DOCUSATE SODIUM 50MG AND SENNOSIDES 8.6MG SCH TAB: 8.6; 5 TABLET, FILM COATED ORAL at 08:03

## 2020-04-27 RX ADMIN — LEVETIRACETAM SCH MG: 500 TABLET ORAL at 08:03

## 2020-04-27 RX ADMIN — FUROSEMIDE SCH MG: 20 TABLET ORAL at 08:04

## 2020-04-27 RX ADMIN — CARBAMIDE PEROXIDE 6.5% OTIC SOLN SCH DROP(S): 6.5 SOLUTION at 08:03

## 2020-04-27 RX ADMIN — TRIAMCINOLONE ACETONIDE SCH APPLIC: 1 CREAM TOPICAL at 20:43

## 2020-04-27 RX ADMIN — MIDODRINE HYDROCHLORIDE SCH MG: 5 TABLET ORAL at 08:03

## 2020-04-28 VITALS — SYSTOLIC BLOOD PRESSURE: 94 MMHG | DIASTOLIC BLOOD PRESSURE: 61 MMHG

## 2020-04-28 VITALS — DIASTOLIC BLOOD PRESSURE: 60 MMHG | SYSTOLIC BLOOD PRESSURE: 95 MMHG

## 2020-04-28 VITALS — SYSTOLIC BLOOD PRESSURE: 95 MMHG | DIASTOLIC BLOOD PRESSURE: 60 MMHG

## 2020-04-28 PROCEDURE — 0W9G3ZZ DRAINAGE OF PERITONEAL CAVITY, PERCUTANEOUS APPROACH: ICD-10-PCS | Performed by: RADIOLOGY

## 2020-04-28 RX ADMIN — CARBAMIDE PEROXIDE 6.5% OTIC SOLN SCH DROP(S): 6.5 SOLUTION at 08:25

## 2020-04-28 RX ADMIN — PANTOPRAZOLE SODIUM SCH MG: 40 TABLET, DELAYED RELEASE ORAL at 05:13

## 2020-04-28 RX ADMIN — LEVETIRACETAM SCH MG: 500 TABLET ORAL at 08:18

## 2020-04-28 RX ADMIN — ALBUMIN (HUMAN) SCH MLS/HR: 25 SOLUTION INTRAVENOUS at 08:18

## 2020-04-28 RX ADMIN — SPIRONOLACTONE SCH MG: 25 TABLET ORAL at 08:19

## 2020-04-28 RX ADMIN — DOCUSATE SODIUM 50MG AND SENNOSIDES 8.6MG SCH TAB: 8.6; 5 TABLET, FILM COATED ORAL at 08:25

## 2020-04-28 RX ADMIN — TRIAMCINOLONE ACETONIDE SCH APPLIC: 1 CREAM TOPICAL at 08:25

## 2020-04-28 RX ADMIN — FUROSEMIDE SCH MG: 20 TABLET ORAL at 08:18

## 2020-04-28 RX ADMIN — MIDODRINE HYDROCHLORIDE SCH MG: 5 TABLET ORAL at 08:19

## 2020-05-05 ENCOUNTER — HOSPITAL ENCOUNTER (EMERGENCY)
Dept: HOSPITAL 8 - ED | Age: 60
LOS: 1 days | Discharge: HOME | End: 2020-05-06
Payer: MEDICAID

## 2020-05-05 VITALS — SYSTOLIC BLOOD PRESSURE: 100 MMHG | DIASTOLIC BLOOD PRESSURE: 58 MMHG

## 2020-05-05 VITALS — WEIGHT: 105.82 LBS | HEIGHT: 65 IN | BODY MASS INDEX: 17.63 KG/M2

## 2020-05-05 DIAGNOSIS — E87.6: ICD-10-CM

## 2020-05-05 DIAGNOSIS — R11.2: ICD-10-CM

## 2020-05-05 DIAGNOSIS — L89.152: Primary | ICD-10-CM

## 2020-05-05 DIAGNOSIS — E87.1: ICD-10-CM

## 2020-05-05 DIAGNOSIS — K75.4: ICD-10-CM

## 2020-05-05 LAB
ALBUMIN SERPL-MCNC: 3 G/DL (ref 3.4–5)
ALP SERPL-CCNC: 250 U/L (ref 45–117)
ALT SERPL-CCNC: 32 U/L (ref 12–78)
ANION GAP SERPL CALC-SCNC: 10 MMOL/L (ref 5–15)
BASOPHILS # BLD AUTO: 0.03 X10^3/UL (ref 0–0.1)
BASOPHILS NFR BLD AUTO: 1 % (ref 0–1)
BILIRUB SERPL-MCNC: 13.4 MG/DL (ref 0.2–1)
CALCIUM SERPL-MCNC: 8.9 MG/DL (ref 8.5–10.1)
CHLORIDE SERPL-SCNC: 100 MMOL/L (ref 98–107)
CREAT SERPL-MCNC: 0.67 MG/DL (ref 0.55–1.02)
EOSINOPHIL # BLD AUTO: 0.02 X10^3/UL (ref 0–0.4)
EOSINOPHIL NFR BLD AUTO: 0 % (ref 1–7)
ERYTHROCYTE [DISTWIDTH] IN BLOOD BY AUTOMATED COUNT: 22.7 % (ref 9.6–15.2)
INR PPP: 1.35 (ref 0.93–1.1)
LYMPHOCYTES # BLD AUTO: 0.77 X10^3/UL (ref 1–3.4)
LYMPHOCYTES NFR BLD AUTO: 17 % (ref 22–44)
MCH RBC QN AUTO: 32.1 PG (ref 27–34.8)
MCHC RBC AUTO-ENTMCNC: 33.3 G/DL (ref 32.4–35.8)
MCV RBC AUTO: 96.5 FL (ref 80–100)
MD: (no result)
MONOCYTES # BLD AUTO: 0.42 X10^3/UL (ref 0.2–0.8)
MONOCYTES NFR BLD AUTO: 9 % (ref 2–9)
NEUTROPHILS # BLD AUTO: 3.32 X10^3/UL (ref 1.8–6.8)
NEUTROPHILS NFR BLD AUTO: 73 % (ref 42–75)
PLATELET # BLD AUTO: 179 X10^3/UL (ref 130–400)
PMV BLD AUTO: 7.9 FL (ref 7.4–10.4)
PROT SERPL-MCNC: 5.4 G/DL (ref 6.4–8.2)
PROTHROMBIN TIME: 14.3 SECONDS (ref 9.6–11.5)
RBC # BLD AUTO: 2.69 X10^6/UL (ref 3.82–5.3)

## 2020-05-05 PROCEDURE — 83690 ASSAY OF LIPASE: CPT

## 2020-05-05 PROCEDURE — 85025 COMPLETE CBC W/AUTO DIFF WBC: CPT

## 2020-05-05 PROCEDURE — 36415 COLL VENOUS BLD VENIPUNCTURE: CPT

## 2020-05-05 PROCEDURE — 85610 PROTHROMBIN TIME: CPT

## 2020-05-05 PROCEDURE — 80053 COMPREHEN METABOLIC PANEL: CPT

## 2020-05-05 PROCEDURE — 99283 EMERGENCY DEPT VISIT LOW MDM: CPT

## 2020-05-05 PROCEDURE — 82140 ASSAY OF AMMONIA: CPT

## 2020-05-10 ENCOUNTER — HOSPITAL ENCOUNTER (INPATIENT)
Dept: HOSPITAL 8 - ED | Age: 60
LOS: 16 days | Discharge: HOME | DRG: 283 | End: 2020-05-26
Attending: FAMILY MEDICINE | Admitting: HOSPITALIST
Payer: MEDICAID

## 2020-05-10 VITALS — SYSTOLIC BLOOD PRESSURE: 91 MMHG | DIASTOLIC BLOOD PRESSURE: 44 MMHG

## 2020-05-10 VITALS — BODY MASS INDEX: 24.28 KG/M2 | HEIGHT: 65 IN | WEIGHT: 145.73 LBS

## 2020-05-10 VITALS — SYSTOLIC BLOOD PRESSURE: 98 MMHG | DIASTOLIC BLOOD PRESSURE: 47 MMHG

## 2020-05-10 VITALS — DIASTOLIC BLOOD PRESSURE: 38 MMHG | SYSTOLIC BLOOD PRESSURE: 90 MMHG
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
## 2020-05-10 DIAGNOSIS — I85.11: ICD-10-CM

## 2020-05-10 DIAGNOSIS — K31.89: ICD-10-CM

## 2020-05-10 DIAGNOSIS — E06.3: ICD-10-CM

## 2020-05-10 DIAGNOSIS — D62: ICD-10-CM

## 2020-05-10 DIAGNOSIS — K75.4: ICD-10-CM

## 2020-05-10 DIAGNOSIS — F41.1: ICD-10-CM

## 2020-05-10 DIAGNOSIS — E43: ICD-10-CM

## 2020-05-10 DIAGNOSIS — J45.909: ICD-10-CM

## 2020-05-10 DIAGNOSIS — K26.4: ICD-10-CM

## 2020-05-10 DIAGNOSIS — R18.8: ICD-10-CM

## 2020-05-10 DIAGNOSIS — F32.9: ICD-10-CM

## 2020-05-10 DIAGNOSIS — E87.6: ICD-10-CM

## 2020-05-10 DIAGNOSIS — D68.9: ICD-10-CM

## 2020-05-10 DIAGNOSIS — Z83.3: ICD-10-CM
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
## 2020-05-10 DIAGNOSIS — K76.6: Primary | ICD-10-CM

## 2020-05-10 DIAGNOSIS — Z91.19: ICD-10-CM

## 2020-05-10 DIAGNOSIS — Z98.51: ICD-10-CM

## 2020-05-10 DIAGNOSIS — Z82.0: ICD-10-CM

## 2020-05-10 DIAGNOSIS — D50.9: ICD-10-CM

## 2020-05-10 DIAGNOSIS — Z79.899: ICD-10-CM

## 2020-05-10 DIAGNOSIS — K74.60: ICD-10-CM

## 2020-05-10 DIAGNOSIS — R29.6: ICD-10-CM

## 2020-05-10 DIAGNOSIS — E87.1: ICD-10-CM

## 2020-05-10 DIAGNOSIS — Z82.49: ICD-10-CM

## 2020-05-10 DIAGNOSIS — G40.909: ICD-10-CM

## 2020-05-10 DIAGNOSIS — F43.10: ICD-10-CM

## 2020-05-10 DIAGNOSIS — E80.6: ICD-10-CM

## 2020-05-10 DIAGNOSIS — E16.2: ICD-10-CM

## 2020-05-10 DIAGNOSIS — N39.0: ICD-10-CM

## 2020-05-10 DIAGNOSIS — E83.51: ICD-10-CM

## 2020-05-10 LAB
ALBUMIN SERPL-MCNC: 3 G/DL (ref 3.4–5)
ALP SERPL-CCNC: 214 U/L (ref 45–117)
ALT SERPL-CCNC: 34 U/L (ref 12–78)
ANION GAP SERPL CALC-SCNC: 12 MMOL/L (ref 5–15)
APTT BLD: 35 SECONDS (ref 25–31)
BASOPHILS # BLD AUTO: 0 X10^3/UL (ref 0–0.1)
BASOPHILS NFR BLD AUTO: 0 % (ref 0–1)
BILIRUB SERPL-MCNC: 11.6 MG/DL (ref 0.2–1)
CALCIUM SERPL-MCNC: 8.3 MG/DL (ref 8.5–10.1)
CHLORIDE SERPL-SCNC: 101 MMOL/L (ref 98–107)
CREAT SERPL-MCNC: 0.68 MG/DL (ref 0.55–1.02)
EOSINOPHIL # BLD AUTO: 0.02 X10^3/UL (ref 0–0.4)
EOSINOPHIL NFR BLD AUTO: 0 % (ref 1–7)
ERYTHROCYTE [DISTWIDTH] IN BLOOD BY AUTOMATED COUNT: 23 % (ref 9.6–15.2)
INR PPP: 1.38 (ref 0.93–1.1)
LYMPHOCYTES # BLD AUTO: 0.74 X10^3/UL (ref 1–3.4)
LYMPHOCYTES NFR BLD AUTO: 17 % (ref 22–44)
MCH RBC QN AUTO: 32.3 PG (ref 27–34.8)
MCHC RBC AUTO-ENTMCNC: 33.2 G/DL (ref 32.4–35.8)
MCV RBC AUTO: 97.4 FL (ref 80–100)
MD: (no result)
MICROSCOPIC: (no result)
MONOCYTES # BLD AUTO: 0.43 X10^3/UL (ref 0.2–0.8)
MONOCYTES NFR BLD AUTO: 10 % (ref 2–9)
NEUTROPHILS # BLD AUTO: 3.15 X10^3/UL (ref 1.8–6.8)
NEUTROPHILS NFR BLD AUTO: 73 % (ref 42–75)
PLATELET # BLD AUTO: 241 X10^3/UL (ref 130–400)
PMV BLD AUTO: 7.1 FL (ref 7.4–10.4)
PROT SERPL-MCNC: 5.2 G/DL (ref 6.4–8.2)
PROTHROMBIN TIME: 14.7 SECONDS (ref 9.6–11.5)
RBC # BLD AUTO: 2.28 X10^6/UL (ref 3.82–5.3)

## 2020-05-10 PROCEDURE — 70450 CT HEAD/BRAIN W/O DYE: CPT

## 2020-05-10 PROCEDURE — 81001 URINALYSIS AUTO W/SCOPE: CPT

## 2020-05-10 PROCEDURE — 80048 BASIC METABOLIC PNL TOTAL CA: CPT

## 2020-05-10 PROCEDURE — P9047 ALBUMIN (HUMAN), 25%, 50ML: HCPCS

## 2020-05-10 PROCEDURE — 87040 BLOOD CULTURE FOR BACTERIA: CPT

## 2020-05-10 PROCEDURE — 96367 TX/PROPH/DG ADDL SEQ IV INF: CPT

## 2020-05-10 PROCEDURE — 36415 COLL VENOUS BLD VENIPUNCTURE: CPT

## 2020-05-10 PROCEDURE — 96375 TX/PRO/DX INJ NEW DRUG ADDON: CPT

## 2020-05-10 PROCEDURE — 96368 THER/DIAG CONCURRENT INF: CPT

## 2020-05-10 PROCEDURE — 83540 ASSAY OF IRON: CPT

## 2020-05-10 PROCEDURE — 86900 BLOOD TYPING SEROLOGIC ABO: CPT

## 2020-05-10 PROCEDURE — 85610 PROTHROMBIN TIME: CPT

## 2020-05-10 PROCEDURE — 85730 THROMBOPLASTIN TIME PARTIAL: CPT

## 2020-05-10 PROCEDURE — 84145 PROCALCITONIN (PCT): CPT

## 2020-05-10 PROCEDURE — 96365 THER/PROPH/DIAG IV INF INIT: CPT

## 2020-05-10 PROCEDURE — 86850 RBC ANTIBODY SCREEN: CPT

## 2020-05-10 PROCEDURE — 83735 ASSAY OF MAGNESIUM: CPT

## 2020-05-10 PROCEDURE — C9113 INJ PANTOPRAZOLE SODIUM, VIA: HCPCS

## 2020-05-10 PROCEDURE — 82330 ASSAY OF CALCIUM: CPT

## 2020-05-10 PROCEDURE — P9016 RBC LEUKOCYTES REDUCED: HCPCS

## 2020-05-10 PROCEDURE — 96366 THER/PROPH/DIAG IV INF ADDON: CPT

## 2020-05-10 PROCEDURE — 76705 ECHO EXAM OF ABDOMEN: CPT

## 2020-05-10 PROCEDURE — 86922 COMPATIBILITY TEST ANTIGLOB: CPT

## 2020-05-10 PROCEDURE — 49083 ABD PARACENTESIS W/IMAGING: CPT

## 2020-05-10 PROCEDURE — 87077 CULTURE AEROBIC IDENTIFY: CPT

## 2020-05-10 PROCEDURE — 85025 COMPLETE CBC W/AUTO DIFF WBC: CPT

## 2020-05-10 PROCEDURE — 85014 HEMATOCRIT: CPT

## 2020-05-10 PROCEDURE — 99291 CRITICAL CARE FIRST HOUR: CPT

## 2020-05-10 PROCEDURE — 87338 HPYLORI STOOL AG IA: CPT

## 2020-05-10 PROCEDURE — 87086 URINE CULTURE/COLONY COUNT: CPT

## 2020-05-10 PROCEDURE — 86022 PLATELET ANTIBODIES: CPT

## 2020-05-10 PROCEDURE — 86923 COMPATIBILITY TEST ELECTRIC: CPT

## 2020-05-10 PROCEDURE — 86870 RBC ANTIBODY IDENTIFICATION: CPT

## 2020-05-10 PROCEDURE — 85018 HEMOGLOBIN: CPT

## 2020-05-10 PROCEDURE — 83605 ASSAY OF LACTIC ACID: CPT

## 2020-05-10 PROCEDURE — 82140 ASSAY OF AMMONIA: CPT

## 2020-05-10 PROCEDURE — 86902 BLOOD TYPE ANTIGEN DONOR EA: CPT

## 2020-05-10 PROCEDURE — 80053 COMPREHEN METABOLIC PANEL: CPT

## 2020-05-10 PROCEDURE — 80069 RENAL FUNCTION PANEL: CPT

## 2020-05-10 PROCEDURE — 83550 IRON BINDING TEST: CPT

## 2020-05-10 PROCEDURE — 87186 SC STD MICRODIL/AGAR DIL: CPT

## 2020-05-10 RX ADMIN — LEVETIRACETAM SCH MLS/HR: 100 INJECTION, SOLUTION, CONCENTRATE INTRAVENOUS at 22:59

## 2020-05-10 NOTE — NUR
BREAK RN: PATIENT RESTING ON GURNEY, RESPIRATIONS EVEN AND UNLABORED. ELLY JENKINS. CALL LIGHT IN REACH

## 2020-05-10 NOTE — NUR
PT BIB EMS FOR A GLF, WEAKNESS AND FAILRUE TO THRIVE. PT IS COMPLAINING OF 
ANXIETY AND PTSD. PT HAS HX OF HEPATITIS. PT CONNECTED TO MONITORING EQUIPMENT. 
BLANKET PROVIDED.

## 2020-05-11 VITALS — SYSTOLIC BLOOD PRESSURE: 90 MMHG | DIASTOLIC BLOOD PRESSURE: 53 MMHG

## 2020-05-11 VITALS — DIASTOLIC BLOOD PRESSURE: 48 MMHG | SYSTOLIC BLOOD PRESSURE: 90 MMHG

## 2020-05-11 VITALS — DIASTOLIC BLOOD PRESSURE: 42 MMHG | SYSTOLIC BLOOD PRESSURE: 95 MMHG

## 2020-05-11 VITALS — SYSTOLIC BLOOD PRESSURE: 88 MMHG | DIASTOLIC BLOOD PRESSURE: 43 MMHG

## 2020-05-11 LAB
<PLATELET ESTIMATE>: ADEQUATE
<PLT MORPHOLOGY>: (no result)
ALBUMIN SERPL-MCNC: 2.3 G/DL (ref 3.4–5)
ALP SERPL-CCNC: 182 U/L (ref 45–117)
ALT SERPL-CCNC: 31 U/L (ref 12–78)
ANION GAP SERPL CALC-SCNC: 7 MMOL/L (ref 5–15)
ANISOCYTOSIS BLD QL SMEAR: (no result)
BASOPHILS # BLD AUTO: 0.02 X10^3/UL (ref 0–0.1)
BASOPHILS NFR BLD AUTO: 1 % (ref 0–1)
BILIRUB SERPL-MCNC: 12.6 MG/DL (ref 0.2–1)
CALCIUM SERPL-MCNC: 7.5 MG/DL (ref 8.5–10.1)
CHLORIDE SERPL-SCNC: 106 MMOL/L (ref 98–107)
CREAT SERPL-MCNC: 0.64 MG/DL (ref 0.55–1.02)
EOSINOPHIL # BLD AUTO: 0.05 X10^3/UL (ref 0–0.4)
EOSINOPHIL NFR BLD AUTO: 1 % (ref 1–7)
ERYTHROCYTE [DISTWIDTH] IN BLOOD BY AUTOMATED COUNT: 23.1 % (ref 9.6–15.2)
HYPOCHROMIA BLD QL SMEAR: (no result)
LYMPHOCYTES # BLD AUTO: 0.96 X10^3/UL (ref 1–3.4)
LYMPHOCYTES NFR BLD AUTO: 26 % (ref 22–44)
MACROCYTES BLD QL SMEAR: (no result)
MCH RBC QN AUTO: 32.8 PG (ref 27–34.8)
MCHC RBC AUTO-ENTMCNC: 34.2 G/DL (ref 32.4–35.8)
MCV RBC AUTO: 95.9 FL (ref 80–100)
MD: (no result)
MONOCYTES # BLD AUTO: 0.29 X10^3/UL (ref 0.2–0.8)
MONOCYTES NFR BLD AUTO: 8 % (ref 2–9)
NEUTROPHILS # BLD AUTO: 2.33 X10^3/UL (ref 1.8–6.8)
NEUTROPHILS NFR BLD AUTO: 64 % (ref 42–75)
PLATELET # BLD AUTO: 187 X10^3/UL (ref 130–400)
PMV BLD AUTO: 6.8 FL (ref 7.4–10.4)
PROT SERPL-MCNC: 4.3 G/DL (ref 6.4–8.2)
RBC # BLD AUTO: 2.71 X10^6/UL (ref 3.82–5.3)

## 2020-05-11 PROCEDURE — 30233N1 TRANSFUSION OF NONAUTOLOGOUS RED BLOOD CELLS INTO PERIPHERAL VEIN, PERCUTANEOUS APPROACH: ICD-10-PCS | Performed by: INTERNAL MEDICINE

## 2020-05-11 PROCEDURE — 0DJ08ZZ INSPECTION OF UPPER INTESTINAL TRACT, VIA NATURAL OR ARTIFICIAL OPENING ENDOSCOPIC: ICD-10-PCS | Performed by: INTERNAL MEDICINE

## 2020-05-11 RX ADMIN — PANTOPRAZOLE SODIUM SCH MLS/HR: 40 INJECTION, POWDER, FOR SOLUTION INTRAVENOUS at 15:39

## 2020-05-11 RX ADMIN — SODIUM CHLORIDE SCH MLS/HR: 0.9 INJECTION, SOLUTION INTRAVENOUS at 08:52

## 2020-05-11 RX ADMIN — FERROUS SULFATE TAB 325 MG (65 MG ELEMENTAL FE) SCH MG: 325 (65 FE) TAB at 08:52

## 2020-05-11 RX ADMIN — LEVETIRACETAM SCH MLS/HR: 100 INJECTION, SOLUTION, CONCENTRATE INTRAVENOUS at 11:32

## 2020-05-11 RX ADMIN — LACTOBACILLUS TAB SCH TAB: TAB at 21:17

## 2020-05-11 RX ADMIN — LEVETIRACETAM SCH MLS/HR: 100 INJECTION, SOLUTION, CONCENTRATE INTRAVENOUS at 22:55

## 2020-05-11 RX ADMIN — LACTOBACILLUS TAB SCH TAB: TAB at 18:14

## 2020-05-11 RX ADMIN — PANTOPRAZOLE SODIUM SCH MLS/HR: 40 INJECTION, POWDER, FOR SOLUTION INTRAVENOUS at 02:35

## 2020-05-11 RX ADMIN — LACTOBACILLUS TAB SCH TAB: TAB at 08:52

## 2020-05-12 VITALS — DIASTOLIC BLOOD PRESSURE: 53 MMHG | SYSTOLIC BLOOD PRESSURE: 89 MMHG

## 2020-05-12 VITALS — DIASTOLIC BLOOD PRESSURE: 52 MMHG | SYSTOLIC BLOOD PRESSURE: 94 MMHG

## 2020-05-12 VITALS — DIASTOLIC BLOOD PRESSURE: 52 MMHG | SYSTOLIC BLOOD PRESSURE: 89 MMHG

## 2020-05-12 VITALS — SYSTOLIC BLOOD PRESSURE: 91 MMHG | DIASTOLIC BLOOD PRESSURE: 57 MMHG

## 2020-05-12 LAB
% IRON SATURATION: 20 % (ref 20–55)
ALBUMIN SERPL-MCNC: 2.2 G/DL (ref 3.4–5)
ALP SERPL-CCNC: 179 U/L (ref 45–117)
ALT SERPL-CCNC: 31 U/L (ref 12–78)
ANION GAP SERPL CALC-SCNC: 6 MMOL/L (ref 5–15)
BASOPHILS # BLD AUTO: 0.03 X10^3/UL (ref 0–0.1)
BASOPHILS NFR BLD AUTO: 1 % (ref 0–1)
BILIRUB SERPL-MCNC: 10.1 MG/DL (ref 0.2–1)
CALCIUM SERPL-MCNC: 7.6 MG/DL (ref 8.5–10.1)
CHLORIDE SERPL-SCNC: 106 MMOL/L (ref 98–107)
CREAT SERPL-MCNC: 0.73 MG/DL (ref 0.55–1.02)
EOSINOPHIL # BLD AUTO: 0.07 X10^3/UL (ref 0–0.4)
EOSINOPHIL NFR BLD AUTO: 2 % (ref 1–7)
ERYTHROCYTE [DISTWIDTH] IN BLOOD BY AUTOMATED COUNT: 23 % (ref 9.6–15.2)
IRON LEVEL: 34 MCG/DL (ref 50–170)
LYMPHOCYTES # BLD AUTO: 0.97 X10^3/UL (ref 1–3.4)
LYMPHOCYTES NFR BLD AUTO: 27 % (ref 22–44)
MCH RBC QN AUTO: 31.9 PG (ref 27–34.8)
MCHC RBC AUTO-ENTMCNC: 32.7 G/DL (ref 32.4–35.8)
MCV RBC AUTO: 97.4 FL (ref 80–100)
MD: (no result)
MONOCYTES # BLD AUTO: 0.34 X10^3/UL (ref 0.2–0.8)
MONOCYTES NFR BLD AUTO: 9 % (ref 2–9)
NEUTROPHILS # BLD AUTO: 2.19 X10^3/UL (ref 1.8–6.8)
NEUTROPHILS NFR BLD AUTO: 61 % (ref 42–75)
PLATELET # BLD AUTO: 209 X10^3/UL (ref 130–400)
PMV BLD AUTO: 7.2 FL (ref 7.4–10.4)
PROT SERPL-MCNC: 4.2 G/DL (ref 6.4–8.2)
RBC # BLD AUTO: 2.97 X10^6/UL (ref 3.82–5.3)
TIBC SERPL-MCNC: 171 MCG/DL (ref 250–450)

## 2020-05-12 RX ADMIN — SODIUM CHLORIDE SCH MLS/HR: 0.9 INJECTION, SOLUTION INTRAVENOUS at 02:18

## 2020-05-12 RX ADMIN — LACTOBACILLUS TAB SCH TAB: TAB at 08:48

## 2020-05-12 RX ADMIN — LACTOBACILLUS TAB SCH TAB: TAB at 18:08

## 2020-05-12 RX ADMIN — LEVETIRACETAM SCH MG: 500 TABLET ORAL at 20:59

## 2020-05-12 RX ADMIN — SODIUM CHLORIDE SCH MLS/HR: 0.9 INJECTION, SOLUTION INTRAVENOUS at 18:08

## 2020-05-12 RX ADMIN — LEVETIRACETAM SCH MG: 500 TABLET ORAL at 08:48

## 2020-05-12 RX ADMIN — LACTOBACILLUS TAB SCH TAB: TAB at 20:59

## 2020-05-12 RX ADMIN — PANTOPRAZOLE SODIUM SCH MLS/HR: 40 INJECTION, POWDER, FOR SOLUTION INTRAVENOUS at 02:15

## 2020-05-13 VITALS — SYSTOLIC BLOOD PRESSURE: 94 MMHG | DIASTOLIC BLOOD PRESSURE: 59 MMHG

## 2020-05-13 VITALS — SYSTOLIC BLOOD PRESSURE: 93 MMHG | DIASTOLIC BLOOD PRESSURE: 60 MMHG

## 2020-05-13 VITALS — SYSTOLIC BLOOD PRESSURE: 93 MMHG | DIASTOLIC BLOOD PRESSURE: 61 MMHG

## 2020-05-13 VITALS — DIASTOLIC BLOOD PRESSURE: 56 MMHG | SYSTOLIC BLOOD PRESSURE: 94 MMHG

## 2020-05-13 RX ADMIN — FERROUS SULFATE TAB 325 MG (65 MG ELEMENTAL FE) SCH MG: 325 (65 FE) TAB at 09:08

## 2020-05-13 RX ADMIN — LACTOBACILLUS TAB SCH TAB: TAB at 09:08

## 2020-05-13 RX ADMIN — DOCUSATE SODIUM 50MG AND SENNOSIDES 8.6MG SCH TAB: 8.6; 5 TABLET, FILM COATED ORAL at 20:11

## 2020-05-13 RX ADMIN — CALCIUM CARBONATE-CHOLECALCIFEROL TAB 250 MG-125 UNIT SCH TAB: 250-125 TAB at 20:11

## 2020-05-13 RX ADMIN — LACTOBACILLUS TAB SCH TAB: TAB at 20:11

## 2020-05-13 RX ADMIN — LEVETIRACETAM SCH MG: 500 TABLET ORAL at 09:08

## 2020-05-13 RX ADMIN — SODIUM CHLORIDE SCH MLS/HR: 0.9 INJECTION, SOLUTION INTRAVENOUS at 09:08

## 2020-05-13 RX ADMIN — PANTOPRAZOLE SODIUM SCH MG: 40 TABLET, DELAYED RELEASE ORAL at 05:16

## 2020-05-13 RX ADMIN — LACTOBACILLUS TAB SCH TAB: TAB at 17:34

## 2020-05-13 RX ADMIN — LEVETIRACETAM SCH MG: 500 TABLET ORAL at 20:11

## 2020-05-14 VITALS — SYSTOLIC BLOOD PRESSURE: 94 MMHG | DIASTOLIC BLOOD PRESSURE: 59 MMHG

## 2020-05-14 VITALS — SYSTOLIC BLOOD PRESSURE: 94 MMHG | DIASTOLIC BLOOD PRESSURE: 58 MMHG

## 2020-05-14 VITALS — DIASTOLIC BLOOD PRESSURE: 55 MMHG | SYSTOLIC BLOOD PRESSURE: 92 MMHG

## 2020-05-14 VITALS — DIASTOLIC BLOOD PRESSURE: 61 MMHG | SYSTOLIC BLOOD PRESSURE: 96 MMHG

## 2020-05-14 LAB
ANION GAP SERPL CALC-SCNC: 7 MMOL/L (ref 5–15)
CALCIUM SERPL-MCNC: 7.6 MG/DL (ref 8.5–10.1)
CHLORIDE SERPL-SCNC: 109 MMOL/L (ref 98–107)
CREAT SERPL-MCNC: 0.55 MG/DL (ref 0.55–1.02)

## 2020-05-14 RX ADMIN — LEVETIRACETAM SCH MG: 500 TABLET ORAL at 10:29

## 2020-05-14 RX ADMIN — CALCIUM CARBONATE-CHOLECALCIFEROL TAB 250 MG-125 UNIT SCH TAB: 250-125 TAB at 19:50

## 2020-05-14 RX ADMIN — CALCIUM CARBONATE-CHOLECALCIFEROL TAB 250 MG-125 UNIT SCH TAB: 250-125 TAB at 10:29

## 2020-05-14 RX ADMIN — DOCUSATE SODIUM 50MG AND SENNOSIDES 8.6MG SCH TAB: 8.6; 5 TABLET, FILM COATED ORAL at 19:50

## 2020-05-14 RX ADMIN — SODIUM CHLORIDE SCH MLS/HR: 0.9 INJECTION, SOLUTION INTRAVENOUS at 16:40

## 2020-05-14 RX ADMIN — PANTOPRAZOLE SODIUM SCH MG: 40 TABLET, DELAYED RELEASE ORAL at 05:11

## 2020-05-14 RX ADMIN — SODIUM CHLORIDE SCH MLS/HR: 0.9 INJECTION, SOLUTION INTRAVENOUS at 01:10

## 2020-05-14 RX ADMIN — LEVETIRACETAM SCH MG: 500 TABLET ORAL at 19:50

## 2020-05-14 RX ADMIN — LACTOBACILLUS TAB SCH TAB: TAB at 19:50

## 2020-05-14 RX ADMIN — LACTOBACILLUS TAB SCH TAB: TAB at 16:38

## 2020-05-14 RX ADMIN — LACTOBACILLUS TAB SCH TAB: TAB at 10:29

## 2020-05-14 RX ADMIN — BISACODYL SCH MG: 10 SUPPOSITORY RECTAL at 11:00

## 2020-05-15 VITALS — DIASTOLIC BLOOD PRESSURE: 52 MMHG | SYSTOLIC BLOOD PRESSURE: 91 MMHG

## 2020-05-15 VITALS — SYSTOLIC BLOOD PRESSURE: 102 MMHG | DIASTOLIC BLOOD PRESSURE: 65 MMHG

## 2020-05-15 VITALS — DIASTOLIC BLOOD PRESSURE: 75 MMHG | SYSTOLIC BLOOD PRESSURE: 105 MMHG

## 2020-05-15 VITALS — DIASTOLIC BLOOD PRESSURE: 61 MMHG | SYSTOLIC BLOOD PRESSURE: 94 MMHG

## 2020-05-15 LAB
ANION GAP SERPL CALC-SCNC: 8 MMOL/L (ref 5–15)
CALCIUM SERPL-MCNC: 7.8 MG/DL (ref 8.5–10.1)
CHLORIDE SERPL-SCNC: 108 MMOL/L (ref 98–107)
CREAT SERPL-MCNC: 0.5 MG/DL (ref 0.55–1.02)

## 2020-05-15 RX ADMIN — FERROUS SULFATE TAB 325 MG (65 MG ELEMENTAL FE) SCH MG: 325 (65 FE) TAB at 09:48

## 2020-05-15 RX ADMIN — SODIUM CHLORIDE SCH MLS/HR: 0.9 INJECTION, SOLUTION INTRAVENOUS at 23:17

## 2020-05-15 RX ADMIN — PANTOPRAZOLE SODIUM SCH MG: 40 TABLET, DELAYED RELEASE ORAL at 05:29

## 2020-05-15 RX ADMIN — CALCIUM CARBONATE-CHOLECALCIFEROL TAB 250 MG-125 UNIT SCH TAB: 250-125 TAB at 21:07

## 2020-05-15 RX ADMIN — LACTOBACILLUS TAB SCH TAB: TAB at 09:48

## 2020-05-15 RX ADMIN — DOCUSATE SODIUM 50MG AND SENNOSIDES 8.6MG SCH TAB: 8.6; 5 TABLET, FILM COATED ORAL at 21:07

## 2020-05-15 RX ADMIN — POTASSIUM CHLORIDE SCH MEQ: 20 TABLET, EXTENDED RELEASE ORAL at 21:07

## 2020-05-15 RX ADMIN — LACTOBACILLUS TAB SCH TAB: TAB at 21:07

## 2020-05-15 RX ADMIN — LACTOBACILLUS TAB SCH TAB: TAB at 17:56

## 2020-05-15 RX ADMIN — CALCIUM CARBONATE-CHOLECALCIFEROL TAB 250 MG-125 UNIT SCH TAB: 250-125 TAB at 09:48

## 2020-05-15 RX ADMIN — LEVETIRACETAM SCH MG: 500 TABLET ORAL at 09:48

## 2020-05-15 RX ADMIN — LEVETIRACETAM SCH MG: 500 TABLET ORAL at 21:07

## 2020-05-15 RX ADMIN — POTASSIUM CHLORIDE SCH MEQ: 20 TABLET, EXTENDED RELEASE ORAL at 09:49

## 2020-05-15 RX ADMIN — BISACODYL SCH MG: 10 SUPPOSITORY RECTAL at 09:00

## 2020-05-16 VITALS — SYSTOLIC BLOOD PRESSURE: 93 MMHG | DIASTOLIC BLOOD PRESSURE: 55 MMHG

## 2020-05-16 VITALS — SYSTOLIC BLOOD PRESSURE: 100 MMHG | DIASTOLIC BLOOD PRESSURE: 65 MMHG

## 2020-05-16 VITALS — SYSTOLIC BLOOD PRESSURE: 95 MMHG | DIASTOLIC BLOOD PRESSURE: 62 MMHG

## 2020-05-16 VITALS — DIASTOLIC BLOOD PRESSURE: 60 MMHG | SYSTOLIC BLOOD PRESSURE: 94 MMHG

## 2020-05-16 LAB
ANION GAP SERPL CALC-SCNC: 5 MMOL/L (ref 5–15)
CALCIUM SERPL-MCNC: 7.9 MG/DL (ref 8.5–10.1)
CHLORIDE SERPL-SCNC: 109 MMOL/L (ref 98–107)
CREAT SERPL-MCNC: 0.57 MG/DL (ref 0.55–1.02)

## 2020-05-16 RX ADMIN — BISACODYL SCH MG: 10 SUPPOSITORY RECTAL at 08:10

## 2020-05-16 RX ADMIN — LEVETIRACETAM SCH MG: 500 TABLET ORAL at 20:05

## 2020-05-16 RX ADMIN — LACTOBACILLUS TAB SCH TAB: TAB at 17:07

## 2020-05-16 RX ADMIN — LEVETIRACETAM SCH MG: 500 TABLET ORAL at 08:10

## 2020-05-16 RX ADMIN — LACTOBACILLUS TAB SCH TAB: TAB at 08:10

## 2020-05-16 RX ADMIN — LACTOBACILLUS TAB SCH TAB: TAB at 20:05

## 2020-05-16 RX ADMIN — CALCIUM CARBONATE-CHOLECALCIFEROL TAB 250 MG-125 UNIT SCH TAB: 250-125 TAB at 20:05

## 2020-05-16 RX ADMIN — PANTOPRAZOLE SODIUM SCH MG: 40 TABLET, DELAYED RELEASE ORAL at 04:50

## 2020-05-16 RX ADMIN — DOCUSATE SODIUM 50MG AND SENNOSIDES 8.6MG SCH TAB: 8.6; 5 TABLET, FILM COATED ORAL at 20:07

## 2020-05-16 RX ADMIN — CALCIUM CARBONATE-CHOLECALCIFEROL TAB 250 MG-125 UNIT SCH TAB: 250-125 TAB at 08:10

## 2020-05-16 RX ADMIN — SODIUM CHLORIDE SCH MLS/HR: 0.9 INJECTION, SOLUTION INTRAVENOUS at 17:07

## 2020-05-17 VITALS — SYSTOLIC BLOOD PRESSURE: 100 MMHG | DIASTOLIC BLOOD PRESSURE: 64 MMHG

## 2020-05-17 VITALS — SYSTOLIC BLOOD PRESSURE: 95 MMHG | DIASTOLIC BLOOD PRESSURE: 59 MMHG

## 2020-05-17 VITALS — DIASTOLIC BLOOD PRESSURE: 62 MMHG | SYSTOLIC BLOOD PRESSURE: 96 MMHG

## 2020-05-17 VITALS — SYSTOLIC BLOOD PRESSURE: 97 MMHG | DIASTOLIC BLOOD PRESSURE: 65 MMHG

## 2020-05-17 VITALS — SYSTOLIC BLOOD PRESSURE: 94 MMHG | DIASTOLIC BLOOD PRESSURE: 57 MMHG

## 2020-05-17 LAB
BASOPHILS # BLD AUTO: 0.02 X10^3/UL (ref 0–0.1)
BASOPHILS NFR BLD AUTO: 0 % (ref 0–1)
EOSINOPHIL # BLD AUTO: 0.08 X10^3/UL (ref 0–0.4)
EOSINOPHIL NFR BLD AUTO: 1 % (ref 1–7)
ERYTHROCYTE [DISTWIDTH] IN BLOOD BY AUTOMATED COUNT: 23 % (ref 9.6–15.2)
LYMPHOCYTES # BLD AUTO: 1.41 X10^3/UL (ref 1–3.4)
LYMPHOCYTES NFR BLD AUTO: 24 % (ref 22–44)
MCH RBC QN AUTO: 32.7 PG (ref 27–34.8)
MCHC RBC AUTO-ENTMCNC: 33.3 G/DL (ref 32.4–35.8)
MCV RBC AUTO: 98.4 FL (ref 80–100)
MD: (no result)
MONOCYTES # BLD AUTO: 0.56 X10^3/UL (ref 0.2–0.8)
MONOCYTES NFR BLD AUTO: 10 % (ref 2–9)
NEUTROPHILS # BLD AUTO: 3.71 X10^3/UL (ref 1.8–6.8)
NEUTROPHILS NFR BLD AUTO: 64 % (ref 42–75)
PLATELET # BLD AUTO: 238 X10^3/UL (ref 130–400)
PMV BLD AUTO: 7.4 FL (ref 7.4–10.4)
RBC # BLD AUTO: 2.66 X10^6/UL (ref 3.82–5.3)

## 2020-05-17 RX ADMIN — CALCIUM CARBONATE-CHOLECALCIFEROL TAB 250 MG-125 UNIT SCH TAB: 250-125 TAB at 08:44

## 2020-05-17 RX ADMIN — LACTOBACILLUS TAB SCH TAB: TAB at 17:43

## 2020-05-17 RX ADMIN — LEVETIRACETAM SCH MG: 500 TABLET ORAL at 08:44

## 2020-05-17 RX ADMIN — BISACODYL SCH MG: 10 SUPPOSITORY RECTAL at 08:44

## 2020-05-17 RX ADMIN — CALCIUM CARBONATE-CHOLECALCIFEROL TAB 250 MG-125 UNIT SCH TAB: 250-125 TAB at 20:34

## 2020-05-17 RX ADMIN — LACTOBACILLUS TAB SCH TAB: TAB at 08:44

## 2020-05-17 RX ADMIN — FERROUS SULFATE TAB 325 MG (65 MG ELEMENTAL FE) SCH MG: 325 (65 FE) TAB at 08:44

## 2020-05-17 RX ADMIN — SODIUM CHLORIDE SCH MLS/HR: 0.9 INJECTION, SOLUTION INTRAVENOUS at 09:49

## 2020-05-17 RX ADMIN — LACTOBACILLUS TAB SCH TAB: TAB at 20:34

## 2020-05-17 RX ADMIN — LEVETIRACETAM SCH MG: 500 TABLET ORAL at 20:34

## 2020-05-17 RX ADMIN — PANTOPRAZOLE SODIUM SCH MG: 40 TABLET, DELAYED RELEASE ORAL at 05:31

## 2020-05-18 VITALS — SYSTOLIC BLOOD PRESSURE: 96 MMHG | DIASTOLIC BLOOD PRESSURE: 65 MMHG

## 2020-05-18 VITALS — DIASTOLIC BLOOD PRESSURE: 55 MMHG | SYSTOLIC BLOOD PRESSURE: 91 MMHG

## 2020-05-18 VITALS — DIASTOLIC BLOOD PRESSURE: 54 MMHG | SYSTOLIC BLOOD PRESSURE: 93 MMHG

## 2020-05-18 VITALS — SYSTOLIC BLOOD PRESSURE: 97 MMHG | DIASTOLIC BLOOD PRESSURE: 61 MMHG

## 2020-05-18 LAB
<PLATELET ESTIMATE>: ADEQUATE
<PLT MORPHOLOGY>: (no result)
ANISOCYTOSIS BLD QL SMEAR: (no result)
BAND#(MANUAL): 0.05 X10^3/UL
EOS#(MANUAL): 0.14 X10^3/UL (ref 0–0.4)
EOS% (MANUAL): 3 % (ref 1–7)
ERYTHROCYTE [DISTWIDTH] IN BLOOD BY AUTOMATED COUNT: 22.7 % (ref 9.6–15.2)
HYPOCHROMIA BLD QL SMEAR: (no result)
LYMPH#(MANUAL): 0.78 X10^3/UL (ref 1–3.4)
LYMPHS% (MANUAL): 17 % (ref 22–44)
MACROCYTES BLD QL SMEAR: (no result)
MCH RBC QN AUTO: 33 PG (ref 27–34.8)
MCHC RBC AUTO-ENTMCNC: 32.9 G/DL (ref 32.4–35.8)
MCV RBC AUTO: 100.1 FL (ref 80–100)
MD: YES
MONOS#(MANUAL): 0.18 X10^3/UL (ref 0.3–2.7)
MONOS% (MANUAL): 4 % (ref 2–9)
NEUTS BAND NFR BLD: 1 % (ref 0–7)
PLATELET # BLD AUTO: 201 X10^3/UL (ref 130–400)
PMV BLD AUTO: 7.3 FL (ref 7.4–10.4)
RBC # BLD AUTO: 2.59 X10^6/UL (ref 3.82–5.3)
SEG#(MANUAL): 3.45 X10^3/UL (ref 1.8–6.8)
SEGS% (MANUAL): 75 % (ref 42–75)

## 2020-05-18 RX ADMIN — LACTOBACILLUS TAB SCH TAB: TAB at 16:17

## 2020-05-18 RX ADMIN — SODIUM CHLORIDE SCH MLS/HR: 0.9 INJECTION, SOLUTION INTRAVENOUS at 01:23

## 2020-05-18 RX ADMIN — SODIUM CHLORIDE SCH MLS/HR: 0.9 INJECTION, SOLUTION INTRAVENOUS at 16:19

## 2020-05-18 RX ADMIN — LEVETIRACETAM SCH MG: 500 TABLET ORAL at 21:57

## 2020-05-18 RX ADMIN — CALCIUM CARBONATE-CHOLECALCIFEROL TAB 250 MG-125 UNIT SCH TAB: 250-125 TAB at 09:19

## 2020-05-18 RX ADMIN — CALCIUM CARBONATE-CHOLECALCIFEROL TAB 250 MG-125 UNIT SCH TAB: 250-125 TAB at 21:57

## 2020-05-18 RX ADMIN — LACTOBACILLUS TAB SCH TAB: TAB at 09:19

## 2020-05-18 RX ADMIN — LEVETIRACETAM SCH MG: 500 TABLET ORAL at 09:19

## 2020-05-18 RX ADMIN — LACTOBACILLUS TAB SCH TAB: TAB at 21:57

## 2020-05-18 RX ADMIN — PANTOPRAZOLE SODIUM SCH MG: 40 TABLET, DELAYED RELEASE ORAL at 04:25

## 2020-05-18 NOTE — NUR
Activity sheet placed on wall of patient's room.  Reviewed with patient and informed RN.

-------------------------------------------------------------------------------

Addendum: 05/18/20 at 1524 by Tanvir Angeles PT

-------------------------------------------------------------------------------

Amended: Links added.

## 2020-05-19 VITALS — DIASTOLIC BLOOD PRESSURE: 47 MMHG | SYSTOLIC BLOOD PRESSURE: 96 MMHG

## 2020-05-19 VITALS — SYSTOLIC BLOOD PRESSURE: 88 MMHG | DIASTOLIC BLOOD PRESSURE: 53 MMHG

## 2020-05-19 VITALS — DIASTOLIC BLOOD PRESSURE: 53 MMHG | SYSTOLIC BLOOD PRESSURE: 92 MMHG

## 2020-05-19 VITALS — SYSTOLIC BLOOD PRESSURE: 94 MMHG | DIASTOLIC BLOOD PRESSURE: 57 MMHG

## 2020-05-19 RX ADMIN — LACTOBACILLUS TAB SCH TAB: TAB at 20:38

## 2020-05-19 RX ADMIN — CALCIUM CARBONATE-CHOLECALCIFEROL TAB 250 MG-125 UNIT SCH TAB: 250-125 TAB at 20:38

## 2020-05-19 RX ADMIN — LACTOBACILLUS TAB SCH TAB: TAB at 16:32

## 2020-05-19 RX ADMIN — FERROUS SULFATE TAB 325 MG (65 MG ELEMENTAL FE) SCH MG: 325 (65 FE) TAB at 08:28

## 2020-05-19 RX ADMIN — LEVETIRACETAM SCH MG: 500 TABLET ORAL at 08:28

## 2020-05-19 RX ADMIN — LACTOBACILLUS TAB SCH TAB: TAB at 08:29

## 2020-05-19 RX ADMIN — PANTOPRAZOLE SODIUM SCH MG: 40 TABLET, DELAYED RELEASE ORAL at 05:55

## 2020-05-19 RX ADMIN — LEVETIRACETAM SCH MG: 500 TABLET ORAL at 20:38

## 2020-05-19 RX ADMIN — CALCIUM CARBONATE-CHOLECALCIFEROL TAB 250 MG-125 UNIT SCH TAB: 250-125 TAB at 08:28

## 2020-05-20 VITALS — DIASTOLIC BLOOD PRESSURE: 59 MMHG | SYSTOLIC BLOOD PRESSURE: 93 MMHG

## 2020-05-20 VITALS — DIASTOLIC BLOOD PRESSURE: 58 MMHG | SYSTOLIC BLOOD PRESSURE: 95 MMHG

## 2020-05-20 VITALS — SYSTOLIC BLOOD PRESSURE: 91 MMHG | DIASTOLIC BLOOD PRESSURE: 57 MMHG

## 2020-05-20 VITALS — SYSTOLIC BLOOD PRESSURE: 99 MMHG | DIASTOLIC BLOOD PRESSURE: 58 MMHG

## 2020-05-20 RX ADMIN — LACTOBACILLUS TAB SCH TAB: TAB at 17:05

## 2020-05-20 RX ADMIN — LACTOBACILLUS TAB SCH TAB: TAB at 08:14

## 2020-05-20 RX ADMIN — PANTOPRAZOLE SODIUM SCH MG: 40 TABLET, DELAYED RELEASE ORAL at 08:14

## 2020-05-20 RX ADMIN — LACTOBACILLUS TAB SCH TAB: TAB at 20:02

## 2020-05-20 RX ADMIN — LEVETIRACETAM SCH MG: 500 TABLET ORAL at 20:02

## 2020-05-20 RX ADMIN — LEVETIRACETAM SCH MG: 500 TABLET ORAL at 08:14

## 2020-05-20 RX ADMIN — CALCIUM CARBONATE-CHOLECALCIFEROL TAB 250 MG-125 UNIT SCH TAB: 250-125 TAB at 08:14

## 2020-05-20 RX ADMIN — CALCIUM CARBONATE-CHOLECALCIFEROL TAB 250 MG-125 UNIT SCH TAB: 250-125 TAB at 20:02

## 2020-05-21 VITALS — DIASTOLIC BLOOD PRESSURE: 49 MMHG | SYSTOLIC BLOOD PRESSURE: 89 MMHG

## 2020-05-21 VITALS — DIASTOLIC BLOOD PRESSURE: 57 MMHG | SYSTOLIC BLOOD PRESSURE: 92 MMHG

## 2020-05-21 VITALS — DIASTOLIC BLOOD PRESSURE: 58 MMHG | SYSTOLIC BLOOD PRESSURE: 95 MMHG

## 2020-05-21 VITALS — DIASTOLIC BLOOD PRESSURE: 65 MMHG | SYSTOLIC BLOOD PRESSURE: 98 MMHG

## 2020-05-21 LAB
ANION GAP SERPL CALC-SCNC: 9 MMOL/L (ref 5–15)
BASOPHILS # BLD AUTO: 0.01 X10^3/UL (ref 0–0.1)
BASOPHILS NFR BLD AUTO: 0 % (ref 0–1)
CALCIUM SERPL-MCNC: 8 MG/DL (ref 8.5–10.1)
CHLORIDE SERPL-SCNC: 107 MMOL/L (ref 98–107)
CREAT SERPL-MCNC: 0.53 MG/DL (ref 0.55–1.02)
EOSINOPHIL # BLD AUTO: 0.05 X10^3/UL (ref 0–0.4)
EOSINOPHIL NFR BLD AUTO: 1 % (ref 1–7)
ERYTHROCYTE [DISTWIDTH] IN BLOOD BY AUTOMATED COUNT: 21.5 % (ref 9.6–15.2)
LYMPHOCYTES # BLD AUTO: 1.24 X10^3/UL (ref 1–3.4)
LYMPHOCYTES NFR BLD AUTO: 29 % (ref 22–44)
MCH RBC QN AUTO: 33.6 PG (ref 27–34.8)
MCHC RBC AUTO-ENTMCNC: 33.3 G/DL (ref 32.4–35.8)
MCV RBC AUTO: 101 FL (ref 80–100)
MD: (no result)
MONOCYTES # BLD AUTO: 0.46 X10^3/UL (ref 0.2–0.8)
MONOCYTES NFR BLD AUTO: 11 % (ref 2–9)
NEUTROPHILS # BLD AUTO: 2.54 X10^3/UL (ref 1.8–6.8)
NEUTROPHILS NFR BLD AUTO: 59 % (ref 42–75)
PLATELET # BLD AUTO: 196 X10^3/UL (ref 130–400)
PMV BLD AUTO: 7.6 FL (ref 7.4–10.4)
RBC # BLD AUTO: 2.39 X10^6/UL (ref 3.82–5.3)

## 2020-05-21 RX ADMIN — LACTOBACILLUS TAB SCH TAB: TAB at 20:09

## 2020-05-21 RX ADMIN — FERROUS SULFATE TAB 325 MG (65 MG ELEMENTAL FE) SCH MG: 325 (65 FE) TAB at 08:00

## 2020-05-21 RX ADMIN — LACTOBACILLUS TAB SCH TAB: TAB at 07:58

## 2020-05-21 RX ADMIN — LEVETIRACETAM SCH MG: 500 TABLET ORAL at 07:59

## 2020-05-21 RX ADMIN — PANTOPRAZOLE SODIUM SCH MG: 40 TABLET, DELAYED RELEASE ORAL at 05:09

## 2020-05-21 RX ADMIN — LACTOBACILLUS TAB SCH TAB: TAB at 16:10

## 2020-05-21 RX ADMIN — LEVETIRACETAM SCH MG: 500 TABLET ORAL at 20:09

## 2020-05-21 RX ADMIN — CALCIUM CARBONATE-CHOLECALCIFEROL TAB 250 MG-125 UNIT SCH TAB: 250-125 TAB at 20:09

## 2020-05-21 RX ADMIN — CALCIUM CARBONATE-CHOLECALCIFEROL TAB 250 MG-125 UNIT SCH TAB: 250-125 TAB at 07:58

## 2020-05-22 VITALS — DIASTOLIC BLOOD PRESSURE: 59 MMHG | SYSTOLIC BLOOD PRESSURE: 95 MMHG

## 2020-05-22 VITALS — SYSTOLIC BLOOD PRESSURE: 95 MMHG | DIASTOLIC BLOOD PRESSURE: 59 MMHG

## 2020-05-22 VITALS — SYSTOLIC BLOOD PRESSURE: 96 MMHG | DIASTOLIC BLOOD PRESSURE: 62 MMHG

## 2020-05-22 VITALS — SYSTOLIC BLOOD PRESSURE: 91 MMHG | DIASTOLIC BLOOD PRESSURE: 61 MMHG

## 2020-05-22 LAB
<PLATELET ESTIMATE>: ADEQUATE
<PLT MORPHOLOGY>: (no result)
ANION GAP SERPL CALC-SCNC: 8 MMOL/L (ref 5–15)
ANISOCYTOSIS BLD QL SMEAR: (no result)
CALCIUM SERPL-MCNC: 8 MG/DL (ref 8.5–10.1)
CHLORIDE SERPL-SCNC: 105 MMOL/L (ref 98–107)
CREAT SERPL-MCNC: 0.57 MG/DL (ref 0.55–1.02)
EOS#(MANUAL): 0.11 X10^3/UL (ref 0–0.4)
EOS% (MANUAL): 2 % (ref 1–7)
ERYTHROCYTE [DISTWIDTH] IN BLOOD BY AUTOMATED COUNT: 21.4 % (ref 9.6–15.2)
HYPOCHROMIA BLD QL SMEAR: (no result)
LYMPH#(MANUAL): 0.86 X10^3/UL (ref 1–3.4)
LYMPHS% (MANUAL): 15 % (ref 22–44)
MACROCYTES BLD QL SMEAR: (no result)
MCH RBC QN AUTO: 33.1 PG (ref 27–34.8)
MCHC RBC AUTO-ENTMCNC: 33.3 G/DL (ref 32.4–35.8)
MCV RBC AUTO: 99.2 FL (ref 80–100)
MD: YES
MONOS#(MANUAL): 0.34 X10^3/UL (ref 0.3–2.7)
MONOS% (MANUAL): 6 % (ref 2–9)
PLATELET # BLD AUTO: 240 X10^3/UL (ref 130–400)
PMV BLD AUTO: 8 FL (ref 7.4–10.4)
POLYCHROMASIA BLD QL SMEAR: (no result)
RBC # BLD AUTO: 2.73 X10^6/UL (ref 3.82–5.3)
SEG#(MANUAL): 4.39 X10^3/UL (ref 1.8–6.8)
SEGS% (MANUAL): 77 % (ref 42–75)
TARGETS BLD QL SMEAR: (no result)

## 2020-05-22 RX ADMIN — LEVETIRACETAM SCH MG: 500 TABLET ORAL at 20:04

## 2020-05-22 RX ADMIN — CALCIUM CARBONATE-CHOLECALCIFEROL TAB 250 MG-125 UNIT SCH TAB: 250-125 TAB at 07:57

## 2020-05-22 RX ADMIN — CALCIUM CARBONATE-CHOLECALCIFEROL TAB 250 MG-125 UNIT SCH TAB: 250-125 TAB at 20:04

## 2020-05-22 RX ADMIN — PANTOPRAZOLE SODIUM SCH MG: 40 TABLET, DELAYED RELEASE ORAL at 05:36

## 2020-05-22 RX ADMIN — LEVETIRACETAM SCH MG: 500 TABLET ORAL at 07:58

## 2020-05-22 RX ADMIN — LACTOBACILLUS TAB SCH TAB: TAB at 15:10

## 2020-05-22 RX ADMIN — LACTOBACILLUS TAB SCH TAB: TAB at 20:04

## 2020-05-22 RX ADMIN — LACTOBACILLUS TAB SCH TAB: TAB at 07:57

## 2020-05-23 VITALS — DIASTOLIC BLOOD PRESSURE: 63 MMHG | SYSTOLIC BLOOD PRESSURE: 97 MMHG

## 2020-05-23 VITALS — SYSTOLIC BLOOD PRESSURE: 93 MMHG | DIASTOLIC BLOOD PRESSURE: 57 MMHG

## 2020-05-23 VITALS — DIASTOLIC BLOOD PRESSURE: 60 MMHG | SYSTOLIC BLOOD PRESSURE: 96 MMHG

## 2020-05-23 VITALS — DIASTOLIC BLOOD PRESSURE: 59 MMHG | SYSTOLIC BLOOD PRESSURE: 95 MMHG

## 2020-05-23 RX ADMIN — LACTOBACILLUS TAB SCH TAB: TAB at 21:40

## 2020-05-23 RX ADMIN — CALCIUM CARBONATE-CHOLECALCIFEROL TAB 250 MG-125 UNIT SCH TAB: 250-125 TAB at 21:40

## 2020-05-23 RX ADMIN — PANTOPRAZOLE SODIUM SCH MG: 40 TABLET, DELAYED RELEASE ORAL at 05:50

## 2020-05-23 RX ADMIN — FERROUS SULFATE TAB 325 MG (65 MG ELEMENTAL FE) SCH MG: 325 (65 FE) TAB at 09:33

## 2020-05-23 RX ADMIN — LACTOBACILLUS TAB SCH TAB: TAB at 16:55

## 2020-05-23 RX ADMIN — LEVETIRACETAM SCH MG: 500 TABLET ORAL at 21:40

## 2020-05-23 RX ADMIN — LEVETIRACETAM SCH MG: 500 TABLET ORAL at 09:33

## 2020-05-23 RX ADMIN — CALCIUM CARBONATE-CHOLECALCIFEROL TAB 250 MG-125 UNIT SCH TAB: 250-125 TAB at 09:33

## 2020-05-23 RX ADMIN — LACTOBACILLUS TAB SCH TAB: TAB at 09:33

## 2020-05-24 VITALS — DIASTOLIC BLOOD PRESSURE: 61 MMHG | SYSTOLIC BLOOD PRESSURE: 93 MMHG

## 2020-05-24 VITALS — DIASTOLIC BLOOD PRESSURE: 59 MMHG | SYSTOLIC BLOOD PRESSURE: 92 MMHG

## 2020-05-24 VITALS — DIASTOLIC BLOOD PRESSURE: 64 MMHG | SYSTOLIC BLOOD PRESSURE: 97 MMHG

## 2020-05-24 VITALS — DIASTOLIC BLOOD PRESSURE: 61 MMHG | SYSTOLIC BLOOD PRESSURE: 96 MMHG

## 2020-05-24 RX ADMIN — LEVETIRACETAM SCH MG: 500 TABLET ORAL at 08:58

## 2020-05-24 RX ADMIN — LACTOBACILLUS TAB SCH TAB: TAB at 20:05

## 2020-05-24 RX ADMIN — CALCIUM CARBONATE-CHOLECALCIFEROL TAB 250 MG-125 UNIT SCH TAB: 250-125 TAB at 08:58

## 2020-05-24 RX ADMIN — LACTOBACILLUS TAB SCH TAB: TAB at 15:53

## 2020-05-24 RX ADMIN — CALCIUM CARBONATE-CHOLECALCIFEROL TAB 250 MG-125 UNIT SCH TAB: 250-125 TAB at 20:05

## 2020-05-24 RX ADMIN — PANTOPRAZOLE SODIUM SCH MG: 40 TABLET, DELAYED RELEASE ORAL at 05:08

## 2020-05-24 RX ADMIN — LACTOBACILLUS TAB SCH TAB: TAB at 08:59

## 2020-05-24 RX ADMIN — LACTOBACILLUS TAB SCH TAB: TAB at 08:58

## 2020-05-24 RX ADMIN — LEVETIRACETAM SCH MG: 500 TABLET ORAL at 20:05

## 2020-05-25 VITALS — DIASTOLIC BLOOD PRESSURE: 57 MMHG | SYSTOLIC BLOOD PRESSURE: 89 MMHG

## 2020-05-25 VITALS — DIASTOLIC BLOOD PRESSURE: 51 MMHG | SYSTOLIC BLOOD PRESSURE: 90 MMHG

## 2020-05-25 VITALS — SYSTOLIC BLOOD PRESSURE: 96 MMHG | DIASTOLIC BLOOD PRESSURE: 92 MMHG

## 2020-05-25 VITALS — SYSTOLIC BLOOD PRESSURE: 92 MMHG | DIASTOLIC BLOOD PRESSURE: 57 MMHG

## 2020-05-25 LAB
<PLATELET ESTIMATE>: ADEQUATE
<PLT MORPHOLOGY>: (no result)
ALBUMIN SERPL-MCNC: 2 G/DL (ref 3.4–5)
ANION GAP SERPL CALC-SCNC: 7 MMOL/L (ref 5–15)
ANISOCYTOSIS BLD QL SMEAR: (no result)
BAND#(MANUAL): 0.05 X10^3/UL
BASOPHILS NFR BLD MANUAL: 1 % (ref 0–1)
BASOS#(MANUAL): 0.05 X10^3/UL (ref 0–0.1)
CALCIUM SERPL-MCNC: 7.9 MG/DL (ref 8.5–10.1)
CHLORIDE SERPL-SCNC: 106 MMOL/L (ref 98–107)
CREAT SERPL-MCNC: 0.53 MG/DL (ref 0.55–1.02)
EOS#(MANUAL): 0.22 X10^3/UL (ref 0–0.4)
EOS% (MANUAL): 4 % (ref 1–7)
ERYTHROCYTE [DISTWIDTH] IN BLOOD BY AUTOMATED COUNT: 20.7 % (ref 9.6–15.2)
HYPOCHROMIA BLD QL SMEAR: (no result)
LYMPH#(MANUAL): 0.86 X10^3/UL (ref 1–3.4)
LYMPHS% (MANUAL): 16 % (ref 22–44)
MACROCYTES BLD QL SMEAR: (no result)
MCH RBC QN AUTO: 33.8 PG (ref 27–34.8)
MCHC RBC AUTO-ENTMCNC: 33.7 G/DL (ref 32.4–35.8)
MCV RBC AUTO: 100.3 FL (ref 80–100)
MD: YES
MONOS#(MANUAL): 0.43 X10^3/UL (ref 0.3–2.7)
MONOS% (MANUAL): 8 % (ref 2–9)
NEUTS BAND NFR BLD: 1 % (ref 0–7)
PLATELET # BLD AUTO: 193 X10^3/UL (ref 130–400)
PMV BLD AUTO: 7.5 FL (ref 7.4–10.4)
RBC # BLD AUTO: 2.43 X10^6/UL (ref 3.82–5.3)
SEG#(MANUAL): 3.78 X10^3/UL (ref 1.8–6.8)
SEGS% (MANUAL): 70 % (ref 42–75)
TARGETS BLD QL SMEAR: (no result)

## 2020-05-25 PROCEDURE — 0W9G3ZZ DRAINAGE OF PERITONEAL CAVITY, PERCUTANEOUS APPROACH: ICD-10-PCS | Performed by: RADIOLOGY

## 2020-05-25 RX ADMIN — LEVETIRACETAM SCH MG: 500 TABLET ORAL at 07:54

## 2020-05-25 RX ADMIN — CALCIUM CARBONATE-CHOLECALCIFEROL TAB 250 MG-125 UNIT SCH TAB: 250-125 TAB at 07:54

## 2020-05-25 RX ADMIN — LEVETIRACETAM SCH MG: 500 TABLET ORAL at 21:12

## 2020-05-25 RX ADMIN — FERROUS SULFATE TAB 325 MG (65 MG ELEMENTAL FE) SCH MG: 325 (65 FE) TAB at 07:54

## 2020-05-25 RX ADMIN — PANTOPRAZOLE SODIUM SCH MG: 40 TABLET, DELAYED RELEASE ORAL at 05:45

## 2020-05-25 RX ADMIN — LACTOBACILLUS TAB SCH TAB: TAB at 16:00

## 2020-05-25 RX ADMIN — CALCIUM CARBONATE-CHOLECALCIFEROL TAB 250 MG-125 UNIT SCH TAB: 250-125 TAB at 21:12

## 2020-05-25 RX ADMIN — LACTOBACILLUS TAB SCH TAB: TAB at 21:00

## 2020-05-25 RX ADMIN — LACTOBACILLUS TAB SCH TAB: TAB at 07:54

## 2020-05-26 VITALS — SYSTOLIC BLOOD PRESSURE: 87 MMHG | DIASTOLIC BLOOD PRESSURE: 54 MMHG

## 2020-05-26 VITALS — DIASTOLIC BLOOD PRESSURE: 60 MMHG | SYSTOLIC BLOOD PRESSURE: 91 MMHG

## 2020-05-26 RX ADMIN — CALCIUM CARBONATE-CHOLECALCIFEROL TAB 250 MG-125 UNIT SCH TAB: 250-125 TAB at 08:17

## 2020-05-26 RX ADMIN — PANTOPRAZOLE SODIUM SCH MG: 40 TABLET, DELAYED RELEASE ORAL at 06:35

## 2020-05-26 RX ADMIN — LACTOBACILLUS TAB SCH TAB: TAB at 08:17

## 2020-05-26 RX ADMIN — LACTOBACILLUS TAB SCH TAB: TAB at 15:44

## 2020-05-26 RX ADMIN — LEVETIRACETAM SCH MG: 500 TABLET ORAL at 08:17

## 2020-06-02 ENCOUNTER — HOSPITAL ENCOUNTER (EMERGENCY)
Dept: HOSPITAL 8 - ED | Age: 60
LOS: 1 days | Discharge: HOME | End: 2020-06-03
Payer: MEDICAID

## 2020-06-02 VITALS — WEIGHT: 163.14 LBS | HEIGHT: 65 IN | BODY MASS INDEX: 27.18 KG/M2

## 2020-06-02 VITALS — SYSTOLIC BLOOD PRESSURE: 92 MMHG | DIASTOLIC BLOOD PRESSURE: 57 MMHG

## 2020-06-02 DIAGNOSIS — K74.60: Primary | ICD-10-CM

## 2020-06-02 PROCEDURE — 49083 ABD PARACENTESIS W/IMAGING: CPT

## 2020-06-02 PROCEDURE — 99285 EMERGENCY DEPT VISIT HI MDM: CPT

## 2020-06-02 NOTE — NUR
Patient BIB [facility] c/o abd swelling for a couple days. Patient has a hx of 
ascites and same complaint;  states she usually gets the fluid drained. Patient 
denies abd pain or SOB. 

Patient is in NAD. Respirations even and unlabored.

## 2020-06-03 NOTE — NUR
Discharge instructions given. All questions and concerns addressed. Patient 
ambulatory with walker with a steady gait. Belongings with patient. Taxi 
voucher given.

## 2020-06-08 ENCOUNTER — HOSPITAL ENCOUNTER (INPATIENT)
Dept: HOSPITAL 8 - ED | Age: 60
LOS: 2 days | Discharge: HOME | DRG: 283 | End: 2020-06-10
Attending: HOSPITALIST | Admitting: STUDENT IN AN ORGANIZED HEALTH CARE EDUCATION/TRAINING PROGRAM
Payer: MEDICAID

## 2020-06-08 VITALS — SYSTOLIC BLOOD PRESSURE: 90 MMHG | DIASTOLIC BLOOD PRESSURE: 41 MMHG

## 2020-06-08 VITALS — SYSTOLIC BLOOD PRESSURE: 90 MMHG | DIASTOLIC BLOOD PRESSURE: 56 MMHG

## 2020-06-08 VITALS — DIASTOLIC BLOOD PRESSURE: 45 MMHG | SYSTOLIC BLOOD PRESSURE: 89 MMHG

## 2020-06-08 VITALS — HEIGHT: 65 IN | BODY MASS INDEX: 20.2 KG/M2 | WEIGHT: 121.25 LBS

## 2020-06-08 VITALS — SYSTOLIC BLOOD PRESSURE: 87 MMHG | DIASTOLIC BLOOD PRESSURE: 40 MMHG

## 2020-06-08 DIAGNOSIS — Z82.49: ICD-10-CM

## 2020-06-08 DIAGNOSIS — E80.6: ICD-10-CM

## 2020-06-08 DIAGNOSIS — D50.0: ICD-10-CM

## 2020-06-08 DIAGNOSIS — Z83.3: ICD-10-CM

## 2020-06-08 DIAGNOSIS — I95.9: ICD-10-CM

## 2020-06-08 DIAGNOSIS — E16.2: ICD-10-CM

## 2020-06-08 DIAGNOSIS — Z79.899: ICD-10-CM

## 2020-06-08 DIAGNOSIS — K75.4: ICD-10-CM

## 2020-06-08 DIAGNOSIS — D68.9: ICD-10-CM

## 2020-06-08 DIAGNOSIS — G40.909: ICD-10-CM

## 2020-06-08 DIAGNOSIS — Z82.5: ICD-10-CM

## 2020-06-08 DIAGNOSIS — R18.8: ICD-10-CM

## 2020-06-08 DIAGNOSIS — E87.1: ICD-10-CM

## 2020-06-08 DIAGNOSIS — K74.60: Primary | ICD-10-CM

## 2020-06-08 DIAGNOSIS — Z98.51: ICD-10-CM

## 2020-06-08 DIAGNOSIS — D64.9: ICD-10-CM

## 2020-06-08 LAB
<PLATELET ESTIMATE>: ADEQUATE
<PLT MORPHOLOGY>: (no result)
ALBUMIN SERPL-MCNC: 1.9 G/DL (ref 3.4–5)
ALP SERPL-CCNC: 368 U/L (ref 45–117)
ALT SERPL-CCNC: 38 U/L (ref 12–78)
ANION GAP SERPL CALC-SCNC: 6 MMOL/L (ref 5–15)
ANISOCYTOSIS BLD QL SMEAR: (no result)
BAND#(MANUAL): 0.06 X10^3/UL
BILIRUB SERPL-MCNC: 7.3 MG/DL (ref 0.2–1)
CALCIUM SERPL-MCNC: 9.4 MG/DL (ref 8.5–10.1)
CELLS COUNTED: 24
CHLORIDE SERPL-SCNC: 94 MMOL/L (ref 98–107)
CREAT SERPL-MCNC: 0.85 MG/DL (ref 0.55–1.02)
EOS#(MANUAL): 0.06 X10^3/UL (ref 0–0.4)
EOS% (MANUAL): 1 % (ref 1–7)
ERYTHROCYTE [DISTWIDTH] IN BLOOD BY AUTOMATED COUNT: 15.9 % (ref 9.6–15.2)
HYPOCHROMIA BLD QL SMEAR: (no result)
INR PPP: 1.1 (ref 0.93–1.1)
LYMPH#(MANUAL): 1.36 X10^3/UL (ref 1–3.4)
LYMPHS% (MANUAL): 22 % (ref 22–44)
MACROCYTES BLD QL SMEAR: (no result)
MCH RBC QN AUTO: 35.2 PG (ref 27–34.8)
MCHC RBC AUTO-ENTMCNC: 34.5 G/DL (ref 32.4–35.8)
MCV RBC AUTO: 102 FL (ref 80–100)
MD: YES
MONOS#(MANUAL): 0.37 X10^3/UL (ref 0.3–2.7)
MONOS% (MANUAL): 6 % (ref 2–9)
NEUTS BAND NFR BLD: 1 % (ref 0–7)
PLATELET # BLD AUTO: 299 X10^3/UL (ref 130–400)
PMV BLD AUTO: 7 FL (ref 7.4–10.4)
POLYCHROMASIA BLD QL SMEAR: (no result)
PROT SERPL-MCNC: 5.1 G/DL (ref 6.4–8.2)
PROTHROMBIN TIME: 11.7 SECONDS (ref 9.6–11.5)
RBC # BLD AUTO: 2.17 X10^6/UL (ref 3.82–5.3)
SEG#(MANUAL): 4.34 X10^3/UL (ref 1.8–6.8)
SEGS% (MANUAL): 70 % (ref 42–75)

## 2020-06-08 PROCEDURE — 36415 COLL VENOUS BLD VENIPUNCTURE: CPT

## 2020-06-08 PROCEDURE — 82042 OTHER SOURCE ALBUMIN QUAN EA: CPT

## 2020-06-08 PROCEDURE — 85025 COMPLETE CBC W/AUTO DIFF WBC: CPT

## 2020-06-08 PROCEDURE — 96374 THER/PROPH/DIAG INJ IV PUSH: CPT

## 2020-06-08 PROCEDURE — C9113 INJ PANTOPRAZOLE SODIUM, VIA: HCPCS

## 2020-06-08 PROCEDURE — 80048 BASIC METABOLIC PNL TOTAL CA: CPT

## 2020-06-08 PROCEDURE — 86923 COMPATIBILITY TEST ELECTRIC: CPT

## 2020-06-08 PROCEDURE — 89051 BODY FLUID CELL COUNT: CPT

## 2020-06-08 PROCEDURE — 87070 CULTURE OTHR SPECIMN AEROBIC: CPT

## 2020-06-08 PROCEDURE — P9047 ALBUMIN (HUMAN), 25%, 50ML: HCPCS

## 2020-06-08 PROCEDURE — 86902 BLOOD TYPE ANTIGEN DONOR EA: CPT

## 2020-06-08 PROCEDURE — 86850 RBC ANTIBODY SCREEN: CPT

## 2020-06-08 PROCEDURE — 87205 SMEAR GRAM STAIN: CPT

## 2020-06-08 PROCEDURE — 99291 CRITICAL CARE FIRST HOUR: CPT

## 2020-06-08 PROCEDURE — 83735 ASSAY OF MAGNESIUM: CPT

## 2020-06-08 PROCEDURE — 85610 PROTHROMBIN TIME: CPT

## 2020-06-08 PROCEDURE — 93005 ELECTROCARDIOGRAM TRACING: CPT

## 2020-06-08 PROCEDURE — 80053 COMPREHEN METABOLIC PANEL: CPT

## 2020-06-08 PROCEDURE — 87075 CULTR BACTERIA EXCEPT BLOOD: CPT

## 2020-06-08 PROCEDURE — 84100 ASSAY OF PHOSPHORUS: CPT

## 2020-06-08 PROCEDURE — P9016 RBC LEUKOCYTES REDUCED: HCPCS

## 2020-06-08 PROCEDURE — 86922 COMPATIBILITY TEST ANTIGLOB: CPT

## 2020-06-08 PROCEDURE — 96375 TX/PRO/DX INJ NEW DRUG ADDON: CPT

## 2020-06-08 PROCEDURE — 82945 GLUCOSE OTHER FLUID: CPT

## 2020-06-08 PROCEDURE — 84157 ASSAY OF PROTEIN OTHER: CPT

## 2020-06-08 PROCEDURE — 49083 ABD PARACENTESIS W/IMAGING: CPT

## 2020-06-08 PROCEDURE — 86870 RBC ANTIBODY IDENTIFICATION: CPT

## 2020-06-08 PROCEDURE — 83615 LACTATE (LD) (LDH) ENZYME: CPT

## 2020-06-08 PROCEDURE — 86900 BLOOD TYPING SEROLOGIC ABO: CPT

## 2020-06-08 PROCEDURE — 0W9G3ZZ DRAINAGE OF PERITONEAL CAVITY, PERCUTANEOUS APPROACH: ICD-10-PCS | Performed by: RADIOLOGY

## 2020-06-08 PROCEDURE — 83690 ASSAY OF LIPASE: CPT

## 2020-06-08 PROCEDURE — 30233N1 TRANSFUSION OF NONAUTOLOGOUS RED BLOOD CELLS INTO PERIPHERAL VEIN, PERCUTANEOUS APPROACH: ICD-10-PCS | Performed by: RADIOLOGY

## 2020-06-08 RX ADMIN — LEVETIRACETAM SCH MG: 500 TABLET ORAL at 20:26

## 2020-06-08 RX ADMIN — PANTOPRAZOLE SODIUM SCH MLS/HR: 40 INJECTION, POWDER, FOR SOLUTION INTRAVENOUS at 16:40

## 2020-06-08 NOTE — NUR
PATIENT ARRIVES WITH [facility] WITH A SWOLLEN BELLY THAT APPEARS TO BE ASCITES, SHE 
STATES SHE HAS "AUTOIMMUNE" HEP C LIVER FAILURE.  SHE HAS BEEN TAPPED 5 TIMES 
TOTAL AND LAST ONE WAS A FEW DAYS AGO.  HE IS HYPOTENSIVE BUT NOT SYMPTOMATIC 
AND STATES THIS IS NORMAL FOR HER.

## 2020-06-08 NOTE — NUR
blood bank called and patient has antibodies, blood will be delayed.  tubing 
ready.  patient is hypotensive but not symptomatic, she's aox4.  alerted md 
law.  patient had 3.5 bottles of fluid removed in paracentesis.

## 2020-06-08 NOTE — NUR
attempted to get a second iv x1  left  arrm, blew. patient states she usually 
gets [facility].  radiology came to tap patient and she left while I was requesting 
protonix/albumin and octreotide gtts from pharmacy.  got the albumin and 
administered to it in IRadiology to help her hypotension during tap.  will hang 
other two drips and blood when available

## 2020-06-09 VITALS — DIASTOLIC BLOOD PRESSURE: 56 MMHG | SYSTOLIC BLOOD PRESSURE: 89 MMHG

## 2020-06-09 VITALS — SYSTOLIC BLOOD PRESSURE: 85 MMHG | DIASTOLIC BLOOD PRESSURE: 45 MMHG

## 2020-06-09 VITALS — SYSTOLIC BLOOD PRESSURE: 91 MMHG | DIASTOLIC BLOOD PRESSURE: 41 MMHG

## 2020-06-09 VITALS — DIASTOLIC BLOOD PRESSURE: 48 MMHG | SYSTOLIC BLOOD PRESSURE: 85 MMHG

## 2020-06-09 VITALS — DIASTOLIC BLOOD PRESSURE: 40 MMHG | SYSTOLIC BLOOD PRESSURE: 81 MMHG

## 2020-06-09 VITALS — DIASTOLIC BLOOD PRESSURE: 41 MMHG | SYSTOLIC BLOOD PRESSURE: 91 MMHG

## 2020-06-09 VITALS — SYSTOLIC BLOOD PRESSURE: 82 MMHG | DIASTOLIC BLOOD PRESSURE: 40 MMHG

## 2020-06-09 VITALS — DIASTOLIC BLOOD PRESSURE: 56 MMHG | SYSTOLIC BLOOD PRESSURE: 93 MMHG

## 2020-06-09 VITALS — SYSTOLIC BLOOD PRESSURE: 89 MMHG | DIASTOLIC BLOOD PRESSURE: 45 MMHG

## 2020-06-09 LAB
<PLATELET ESTIMATE>: ADEQUATE
<PLT MORPHOLOGY>: (no result)
ANION GAP SERPL CALC-SCNC: 10 MMOL/L (ref 5–15)
ANISOCYTOSIS BLD QL SMEAR: (no result)
BAND#(MANUAL): 0.07 X10^3/UL
CALCIUM SERPL-MCNC: 8.3 MG/DL (ref 8.5–10.1)
CHLORIDE SERPL-SCNC: 100 MMOL/L (ref 98–107)
CREAT SERPL-MCNC: 0.62 MG/DL (ref 0.55–1.02)
EOS#(MANUAL): 0.07 X10^3/UL (ref 0–0.4)
EOS% (MANUAL): 2 % (ref 1–7)
ERYTHROCYTE [DISTWIDTH] IN BLOOD BY AUTOMATED COUNT: 19.7 % (ref 9.6–15.2)
HYPOCHROMIA BLD QL SMEAR: (no result)
LYMPH#(MANUAL): 0.5 X10^3/UL (ref 1–3.4)
LYMPHS% (MANUAL): 15 % (ref 22–44)
MCH RBC QN AUTO: 32.9 PG (ref 27–34.8)
MCHC RBC AUTO-ENTMCNC: 33.5 G/DL (ref 32.4–35.8)
MCV RBC AUTO: 98.1 FL (ref 80–100)
MD: YES
MONOS#(MANUAL): 0.07 X10^3/UL (ref 0.3–2.7)
MONOS% (MANUAL): 2 % (ref 2–9)
NEUTS BAND NFR BLD: 2 % (ref 0–7)
PLATELET # BLD AUTO: 210 X10^3/UL (ref 130–400)
PMV BLD AUTO: 6.9 FL (ref 7.4–10.4)
POLYCHROMASIA BLD QL SMEAR: (no result)
RBC # BLD AUTO: 2.71 X10^6/UL (ref 3.82–5.3)
SEG#(MANUAL): 2.61 X10^3/UL (ref 1.8–6.8)
SEGS% (MANUAL): 79 % (ref 42–75)

## 2020-06-09 RX ADMIN — SPIRONOLACTONE SCH MG: 100 TABLET ORAL at 09:52

## 2020-06-09 RX ADMIN — PANTOPRAZOLE SODIUM SCH MG: 40 TABLET, DELAYED RELEASE ORAL at 17:07

## 2020-06-09 RX ADMIN — PANTOPRAZOLE SODIUM SCH MLS/HR: 40 INJECTION, POWDER, FOR SOLUTION INTRAVENOUS at 00:12

## 2020-06-09 RX ADMIN — FUROSEMIDE SCH MG: 20 TABLET ORAL at 09:52

## 2020-06-09 RX ADMIN — LEVETIRACETAM SCH MG: 500 TABLET ORAL at 21:01

## 2020-06-09 RX ADMIN — LEVETIRACETAM SCH MG: 500 TABLET ORAL at 09:52

## 2020-06-10 VITALS — SYSTOLIC BLOOD PRESSURE: 83 MMHG | DIASTOLIC BLOOD PRESSURE: 47 MMHG

## 2020-06-10 VITALS — DIASTOLIC BLOOD PRESSURE: 56 MMHG | SYSTOLIC BLOOD PRESSURE: 92 MMHG

## 2020-06-10 VITALS — DIASTOLIC BLOOD PRESSURE: 42 MMHG | SYSTOLIC BLOOD PRESSURE: 89 MMHG

## 2020-06-10 VITALS — DIASTOLIC BLOOD PRESSURE: 40 MMHG | SYSTOLIC BLOOD PRESSURE: 81 MMHG

## 2020-06-10 LAB
<PLATELET ESTIMATE>: ADEQUATE
<PLT MORPHOLOGY>: (no result)
ALBUMIN SERPL-MCNC: 2.4 G/DL (ref 3.4–5)
ALP SERPL-CCNC: 230 U/L (ref 45–117)
ALT SERPL-CCNC: 29 U/L (ref 12–78)
ANION GAP SERPL CALC-SCNC: 6 MMOL/L (ref 5–15)
ANISOCYTOSIS BLD QL SMEAR: (no result)
BASOPHILS NFR BLD MANUAL: 1 % (ref 0–1)
BASOS#(MANUAL): 0.04 X10^3/UL (ref 0–0.1)
BILIRUB SERPL-MCNC: 5.5 MG/DL (ref 0.2–1)
CALCIUM SERPL-MCNC: 7.6 MG/DL (ref 8.5–10.1)
CHLORIDE SERPL-SCNC: 105 MMOL/L (ref 98–107)
CREAT SERPL-MCNC: 0.84 MG/DL (ref 0.55–1.02)
ERYTHROCYTE [DISTWIDTH] IN BLOOD BY AUTOMATED COUNT: 19.4 % (ref 9.6–15.2)
HYPOCHROMIA BLD QL SMEAR: (no result)
LYMPH#(MANUAL): 0.59 X10^3/UL (ref 1–3.4)
LYMPHS% (MANUAL): 14 % (ref 22–44)
MCH RBC QN AUTO: 33 PG (ref 27–34.8)
MCHC RBC AUTO-ENTMCNC: 33.2 G/DL (ref 32.4–35.8)
MCV RBC AUTO: 99.2 FL (ref 80–100)
MD: YES
MONOS#(MANUAL): 0.29 X10^3/UL (ref 0.3–2.7)
MONOS% (MANUAL): 7 % (ref 2–9)
PLATELET # BLD AUTO: 184 X10^3/UL (ref 130–400)
PMV BLD AUTO: 7.4 FL (ref 7.4–10.4)
POLYCHROMASIA BLD QL SMEAR: (no result)
PROT SERPL-MCNC: 4.6 G/DL (ref 6.4–8.2)
RBC # BLD AUTO: 2.51 X10^6/UL (ref 3.82–5.3)
SEG#(MANUAL): 3.28 X10^3/UL (ref 1.8–6.8)
SEGS% (MANUAL): 78 % (ref 42–75)

## 2020-06-10 RX ADMIN — FUROSEMIDE SCH MG: 20 TABLET ORAL at 09:10

## 2020-06-10 RX ADMIN — PANTOPRAZOLE SODIUM SCH MG: 40 TABLET, DELAYED RELEASE ORAL at 05:16

## 2020-06-10 RX ADMIN — SPIRONOLACTONE SCH MG: 100 TABLET ORAL at 09:10

## 2020-06-10 RX ADMIN — LEVETIRACETAM SCH MG: 500 TABLET ORAL at 09:10

## 2020-06-15 ENCOUNTER — HOSPITAL ENCOUNTER (EMERGENCY)
Dept: HOSPITAL 8 - ED | Age: 60
Discharge: HOME | End: 2020-06-15
Payer: MEDICAID

## 2020-06-15 VITALS — DIASTOLIC BLOOD PRESSURE: 49 MMHG | SYSTOLIC BLOOD PRESSURE: 88 MMHG

## 2020-06-15 VITALS — HEIGHT: 65 IN | BODY MASS INDEX: 20.2 KG/M2 | WEIGHT: 121.25 LBS

## 2020-06-15 DIAGNOSIS — K70.31: Primary | ICD-10-CM

## 2020-06-15 LAB
<PLATELET ESTIMATE>: ADEQUATE
<PLT MORPHOLOGY>: (no result)
ALBUMIN SERPL-MCNC: 2.1 G/DL (ref 3.4–5)
ALP SERPL-CCNC: 368 U/L (ref 45–117)
ALT SERPL-CCNC: 46 U/L (ref 12–78)
ANION GAP SERPL CALC-SCNC: 7 MMOL/L (ref 5–15)
ANISOCYTOSIS BLD QL SMEAR: (no result)
APTT BLD: 31 SECONDS (ref 25–31)
BAND#(MANUAL): 0.06 X10^3/UL
BILIRUB SERPL-MCNC: 6 MG/DL (ref 0.2–1)
CALCIUM SERPL-MCNC: 7.8 MG/DL (ref 8.5–10.1)
CELLS COUNTED: 55
CHLORIDE SERPL-SCNC: 97 MMOL/L (ref 98–107)
CREAT SERPL-MCNC: 0.86 MG/DL (ref 0.55–1.02)
ERYTHROCYTE [DISTWIDTH] IN BLOOD BY AUTOMATED COUNT: 17 % (ref 9.6–15.2)
HYPOCHROMIA BLD QL SMEAR: (no result)
INR PPP: 1.09 (ref 0.93–1.1)
LYMPH#(MANUAL): 1.06 X10^3/UL (ref 1–3.4)
LYMPHS% (MANUAL): 18 % (ref 22–44)
MCH RBC QN AUTO: 32.8 PG (ref 27–34.8)
MCHC RBC AUTO-ENTMCNC: 33.3 G/DL (ref 32.4–35.8)
MCV RBC AUTO: 98.3 FL (ref 80–100)
MD: YES
MICROSCOPIC: (no result)
MONOS#(MANUAL): 0.59 X10^3/UL (ref 0.3–2.7)
MONOS% (MANUAL): 10 % (ref 2–9)
NEUTS BAND NFR BLD: 1 % (ref 0–7)
PLATELET # BLD AUTO: 263 X10^3/UL (ref 130–400)
PMV BLD AUTO: 7.4 FL (ref 7.4–10.4)
POLYCHROMASIA BLD QL SMEAR: (no result)
PROT SERPL-MCNC: 5.1 G/DL (ref 6.4–8.2)
PROTHROMBIN TIME: 11.6 SECONDS (ref 9.6–11.5)
RBC # BLD AUTO: 2.5 X10^6/UL (ref 3.82–5.3)
SEG#(MANUAL): 4.19 X10^3/UL (ref 1.8–6.8)
SEGS% (MANUAL): 71 % (ref 42–75)

## 2020-06-15 PROCEDURE — 80053 COMPREHEN METABOLIC PANEL: CPT

## 2020-06-15 PROCEDURE — 83690 ASSAY OF LIPASE: CPT

## 2020-06-15 PROCEDURE — 87077 CULTURE AEROBIC IDENTIFY: CPT

## 2020-06-15 PROCEDURE — 89051 BODY FLUID CELL COUNT: CPT

## 2020-06-15 PROCEDURE — 99285 EMERGENCY DEPT VISIT HI MDM: CPT

## 2020-06-15 PROCEDURE — 81001 URINALYSIS AUTO W/SCOPE: CPT

## 2020-06-15 PROCEDURE — 82042 OTHER SOURCE ALBUMIN QUAN EA: CPT

## 2020-06-15 PROCEDURE — 85730 THROMBOPLASTIN TIME PARTIAL: CPT

## 2020-06-15 PROCEDURE — 36415 COLL VENOUS BLD VENIPUNCTURE: CPT

## 2020-06-15 PROCEDURE — 85025 COMPLETE CBC W/AUTO DIFF WBC: CPT

## 2020-06-15 PROCEDURE — 87086 URINE CULTURE/COLONY COUNT: CPT

## 2020-06-15 PROCEDURE — 85610 PROTHROMBIN TIME: CPT

## 2020-06-15 PROCEDURE — 87205 SMEAR GRAM STAIN: CPT

## 2020-06-15 PROCEDURE — 83615 LACTATE (LD) (LDH) ENZYME: CPT

## 2020-06-15 PROCEDURE — 87070 CULTURE OTHR SPECIMN AEROBIC: CPT

## 2020-06-15 PROCEDURE — 49083 ABD PARACENTESIS W/IMAGING: CPT

## 2020-06-15 NOTE — NUR
Awaiting lab results for fluid from paracentesis. Patient BP remains in the 
80s. Admin another bolus per mar. Patient states, "I just want to go home. I'm 
almost ready to just get dressed and leave." Patient agreed to reevaluate after 
the fluid bolus.

## 2020-06-15 NOTE — NUR
Patient BP increased to her baseline. Discharge instructions given. All 
questions and concerns addressed. Patient ambulatory with a steady gait. 
Belongings with patient.

## 2020-06-15 NOTE — NUR
Report received from MARIA TERESA Ivan. This RN to assume care. Awaiting lab results at 
this time. Admin fluid bolus per mar due to patient's BP.

## 2020-06-15 NOTE — NUR
STRAIGHT CATH URINE SAMPLE OBTAINED. SMALL PIECE OF ROUND PINK TISSUE COMING 
FROM [facility]RETHRA OBSERVED.  NOTIFIED.

## 2020-06-15 NOTE — NUR
THIS IS A 61 YO F BIB EMS [facility] W/ C/O ABD PAIN 
3-4 DAYS. PT REPORTS SHE HAS HX OF ASCITES AND GETS DRAINED FREQUENTLY. PT 
REPORTS RECENT ADMIT HERE THIS MONTH FOR THE SAME. PT CONNECTED TO ALL 
MONITORING, VSS, NADN. PROVIDED WARM BLANKET FOR COMFORT. CALL LIGHT IN REACH, 
SIDE RAILS UPX2. AWAITING ED EVAL.

## 2020-06-20 ENCOUNTER — HOSPITAL ENCOUNTER (EMERGENCY)
Dept: HOSPITAL 8 - ED | Age: 60
Discharge: HOME | End: 2020-06-20
Payer: MEDICAID

## 2020-06-20 VITALS — WEIGHT: 123.46 LBS | HEIGHT: 65 IN | BODY MASS INDEX: 20.57 KG/M2

## 2020-06-20 VITALS — SYSTOLIC BLOOD PRESSURE: 90 MMHG | DIASTOLIC BLOOD PRESSURE: 56 MMHG

## 2020-06-20 DIAGNOSIS — E87.1: ICD-10-CM

## 2020-06-20 DIAGNOSIS — K70.31: Primary | ICD-10-CM

## 2020-06-20 DIAGNOSIS — E87.6: ICD-10-CM

## 2020-06-20 DIAGNOSIS — R63.0: ICD-10-CM

## 2020-06-20 PROCEDURE — 99285 EMERGENCY DEPT VISIT HI MDM: CPT

## 2020-06-20 PROCEDURE — 49083 ABD PARACENTESIS W/IMAGING: CPT

## 2020-06-23 ENCOUNTER — HOSPITAL ENCOUNTER (EMERGENCY)
Dept: HOSPITAL 8 - ED | Age: 60
Discharge: HOME | End: 2020-06-23
Payer: MEDICAID

## 2020-06-23 VITALS — SYSTOLIC BLOOD PRESSURE: 91 MMHG | DIASTOLIC BLOOD PRESSURE: 40 MMHG

## 2020-06-23 VITALS — BODY MASS INDEX: 19.83 KG/M2 | WEIGHT: 119.05 LBS | HEIGHT: 65 IN

## 2020-06-23 DIAGNOSIS — I95.81: ICD-10-CM

## 2020-06-23 DIAGNOSIS — K70.31: Primary | ICD-10-CM

## 2020-06-23 PROCEDURE — 99285 EMERGENCY DEPT VISIT HI MDM: CPT

## 2020-06-23 PROCEDURE — 49083 ABD PARACENTESIS W/IMAGING: CPT

## 2020-06-23 NOTE — NUR
PT HYPOTENSIVE AFTER PARACENTESIS. PT SAID SHE WANTED TO GO HOME. PT 
UNDERSTANDS THE RAMIFICATIONS OF GOING HOME WITH A LOW BP. MD EXPLAINED TO PT 
SHE SHOULD FALL AND HIT HER HEAD AND POTENTIALL GET A BRAIN BLEED AND DIE. PT 
UNDERSTANDS THIS AND STILL WANTS TO LEAVE.

## 2020-06-27 ENCOUNTER — HOSPITAL ENCOUNTER (EMERGENCY)
Dept: HOSPITAL 8 - ED | Age: 60
Discharge: HOME | End: 2020-06-27
Payer: MEDICAID

## 2020-06-27 VITALS — BODY MASS INDEX: 20.57 KG/M2 | HEIGHT: 65 IN | WEIGHT: 123.46 LBS

## 2020-06-27 VITALS — SYSTOLIC BLOOD PRESSURE: 94 MMHG | DIASTOLIC BLOOD PRESSURE: 53 MMHG

## 2020-06-27 DIAGNOSIS — R10.9: ICD-10-CM

## 2020-06-27 DIAGNOSIS — R18.8: Primary | ICD-10-CM

## 2020-06-27 PROCEDURE — 49083 ABD PARACENTESIS W/IMAGING: CPT

## 2020-06-27 PROCEDURE — 99285 EMERGENCY DEPT VISIT HI MDM: CPT

## 2020-06-27 NOTE — NUR
Pt is ascetic and reports she needs her abd drained. Pt reports chronic liver 
history. Pt is jaundice and has yellow sclera. Pt rpeorts feeling weaker and 
not well. Pt wants to be able to take a deep breath without feeling so full or 
so much pressure on her abd. Pt denies any truama. Pt connected to monitors, pt 
is HOTN but she reports her bp is always low. Call light in reach and awaiting 
further orders.

## 2020-06-29 ENCOUNTER — HOSPITAL ENCOUNTER (EMERGENCY)
Dept: HOSPITAL 8 - ED | Age: 60
End: 2020-06-29
Payer: MEDICAID

## 2020-06-29 VITALS — WEIGHT: 123.46 LBS | BODY MASS INDEX: 20.57 KG/M2 | HEIGHT: 65 IN

## 2020-06-29 VITALS — DIASTOLIC BLOOD PRESSURE: 42 MMHG | SYSTOLIC BLOOD PRESSURE: 92 MMHG
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
## 2020-06-29 DIAGNOSIS — S00.03XA: Primary | ICD-10-CM
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
## 2020-06-29 PROCEDURE — 99283 EMERGENCY DEPT VISIT LOW MDM: CPT

## 2020-06-29 NOTE — NUR
BIB BY [facility] FOR MECHANICAL GLF. HIT RIGHT SIDE OF HEAD.

NO ASSESSED OR REPORTED TRAUMA OTHER THAN REPORTING RIGHT SIDED HA

NO LOC, NO BLOOD THINNER (DOES HAVE ADVANCED CIRRHOSIS)

HYPOTENSIVE 85/40 (REPORTS NORMAL SBP 90), HR 76. BP IS 91/56 NOW. PT'S AOX4. 
RESPS EVEN AND UNLABORED. ALL MONITORS IN PLACE. CALL LIGHT WITHIN REACH. PT 
DENIES ANY OTHER SX.

## 2020-06-29 NOTE — NUR
Patient given discharge instructions and they have confirmed that they 
understand the instructions.  PT HYPOTENSIVE AND EDMD OK'D TO DC. PT WHEELED TO 
DC. TAXI VOUCHER GIVEN AT [place].

## 2020-07-02 ENCOUNTER — HOSPITAL ENCOUNTER (EMERGENCY)
Facility: MEDICAL CENTER | Age: 60
End: 2020-07-02
Attending: EMERGENCY MEDICINE
Payer: MEDICAID

## 2020-07-02 VITALS
BODY MASS INDEX: 20.16 KG/M2 | DIASTOLIC BLOOD PRESSURE: 51 MMHG | SYSTOLIC BLOOD PRESSURE: 87 MMHG | OXYGEN SATURATION: 100 % | RESPIRATION RATE: 18 BRPM | HEART RATE: 66 BPM | WEIGHT: 121 LBS | TEMPERATURE: 99.2 F | HEIGHT: 65 IN

## 2020-07-02 DIAGNOSIS — R18.8 OTHER ASCITES: ICD-10-CM

## 2020-07-02 DIAGNOSIS — R53.1 WEAKNESS: ICD-10-CM

## 2020-07-02 DIAGNOSIS — Z98.890 S/P ABDOMINAL PARACENTESIS: ICD-10-CM

## 2020-07-02 LAB
ALBUMIN SERPL BCP-MCNC: 2.7 G/DL (ref 3.2–4.9)
ALBUMIN/GLOB SERPL: 1.1 G/DL
ALP SERPL-CCNC: 479 U/L (ref 30–99)
ALT SERPL-CCNC: 80 U/L (ref 2–50)
ANION GAP SERPL CALC-SCNC: 12 MMOL/L (ref 7–16)
APPEARANCE FLD: CLEAR
AST SERPL-CCNC: 169 U/L (ref 12–45)
BASOPHILS # BLD AUTO: 0.2 % (ref 0–1.8)
BASOPHILS # BLD: 0.01 K/UL (ref 0–0.12)
BILIRUB SERPL-MCNC: 4.3 MG/DL (ref 0.1–1.5)
BODY FLD TYPE: NORMAL
BUN SERPL-MCNC: 44 MG/DL (ref 8–22)
CALCIUM SERPL-MCNC: 8.2 MG/DL (ref 8.5–10.5)
CHLORIDE SERPL-SCNC: 92 MMOL/L (ref 96–112)
CO2 SERPL-SCNC: 17 MMOL/L (ref 20–33)
COLOR FLD: YELLOW
CREAT SERPL-MCNC: 1.04 MG/DL (ref 0.5–1.4)
CSF COMMENTS 1658: NORMAL
EOSINOPHIL # BLD AUTO: 0.02 K/UL (ref 0–0.51)
EOSINOPHIL NFR BLD: 0.4 % (ref 0–6.9)
ERYTHROCYTE [DISTWIDTH] IN BLOOD BY AUTOMATED COUNT: 48 FL (ref 35.9–50)
GLOBULIN SER CALC-MCNC: 2.5 G/DL (ref 1.9–3.5)
GLUCOSE SERPL-MCNC: 104 MG/DL (ref 65–99)
HCT VFR BLD AUTO: 26.4 % (ref 37–47)
HGB BLD-MCNC: 9.1 G/DL (ref 12–16)
IMM GRANULOCYTES # BLD AUTO: 0.04 K/UL (ref 0–0.11)
IMM GRANULOCYTES NFR BLD AUTO: 0.8 % (ref 0–0.9)
INR PPP: 1.04 (ref 0.87–1.13)
LYMPHOCYTES # BLD AUTO: 0.94 K/UL (ref 1–4.8)
LYMPHOCYTES NFR BLD: 17.7 % (ref 22–41)
LYMPHOCYTES NFR FLD: 13 %
MCH RBC QN AUTO: 32.7 PG (ref 27–33)
MCHC RBC AUTO-ENTMCNC: 34.5 G/DL (ref 33.6–35)
MCV RBC AUTO: 95 FL (ref 81.4–97.8)
MESOTHL CELL NFR FLD: 4 %
MONOCYTES # BLD AUTO: 0.64 K/UL (ref 0–0.85)
MONOCYTES NFR BLD AUTO: 12.1 % (ref 0–13.4)
MONONUC CELLS NFR FLD: 81 %
NEUTROPHILS # BLD AUTO: 3.65 K/UL (ref 2–7.15)
NEUTROPHILS NFR BLD: 68.8 % (ref 44–72)
NEUTROPHILS NFR FLD: 2 %
NRBC # BLD AUTO: 0 K/UL
NRBC BLD-RTO: 0 /100 WBC
PLATELET # BLD AUTO: 260 K/UL (ref 164–446)
PMV BLD AUTO: 9.3 FL (ref 9–12.9)
POTASSIUM SERPL-SCNC: 3.6 MMOL/L (ref 3.6–5.5)
PROT SERPL-MCNC: 5.2 G/DL (ref 6–8.2)
PROTHROMBIN TIME: 14 SEC (ref 12–14.6)
RBC # BLD AUTO: 2.78 M/UL (ref 4.2–5.4)
RBC # FLD: <2000 CELLS/UL
SODIUM SERPL-SCNC: 121 MMOL/L (ref 135–145)
WBC # BLD AUTO: 5.3 K/UL (ref 4.8–10.8)
WBC # FLD: 15 CELLS/UL

## 2020-07-02 PROCEDURE — 85610 PROTHROMBIN TIME: CPT

## 2020-07-02 PROCEDURE — 80053 COMPREHEN METABOLIC PANEL: CPT

## 2020-07-02 PROCEDURE — 89051 BODY FLUID CELL COUNT: CPT

## 2020-07-02 PROCEDURE — 36415 COLL VENOUS BLD VENIPUNCTURE: CPT

## 2020-07-02 PROCEDURE — 99285 EMERGENCY DEPT VISIT HI MDM: CPT

## 2020-07-02 PROCEDURE — 49082 ABD PARACENTESIS: CPT

## 2020-07-02 PROCEDURE — 85025 COMPLETE CBC W/AUTO DIFF WBC: CPT

## 2020-07-02 RX ORDER — ALBUMIN (HUMAN) 12.5 G/50ML
50 SOLUTION INTRAVENOUS ONCE
Status: DISCONTINUED | OUTPATIENT
Start: 2020-07-02 | End: 2020-07-03 | Stop reason: HOSPADM

## 2020-07-02 NOTE — ED TRIAGE NOTES
"Chief Complaint   Patient presents with   • Abdominal Pain     pain and destention, hx of liver failure. Paracentesis 3 days ago, sts needs another       BIB EMS FOR PAIN AND DISTENTION OF ABD. DENIES N/V/D.    BP (!) 97/56   Pulse 78   Temp 37.3 °C (99.2 °F) (Temporal)   Resp 14   Ht 1.651 m (5' 5\")   Wt 54.9 kg (121 lb)   SpO2 100%   BMI 20.14 kg/m²      "

## 2020-07-02 NOTE — ED PROVIDER NOTES
"ED Provider Note    CHIEF COMPLAINT  Chief Complaint   Patient presents with   • Abdominal Pain     pain and destention, hx of liver failure. Paracentesis 3 days ago, sts needs another       HPI  Halina Abbott is a 60 y.o. female who presents with abdominal distention and discomfort.  Has a history of chronic end-stage liver disease and recurrent paracenteses.  Typically has been performed at [facility].  Last 1 was done 3 days ago.  She was sent here instead because of the outside hospital being on diversion and EMS brought the patient here for further evaluation.    Patient denies any fevers.  No nausea or vomiting or diarrhea.  No chest pain or trouble breathing.     REVIEW OF SYSTEMS  See HPI for further details. All other systems are negative.     PAST MEDICAL HISTORY   has a past medical history of Bowel habit changes, Bulging lumbar disc (2019), Dental disorder (04/2019), Heart burn, Indigestion, Jaundice, Liver failure (HCC), Psychiatric problem, Rosacea, and Seizure (HCC).    SOCIAL HISTORY  Social History     Tobacco Use   • Smoking status: Never Smoker   • Smokeless tobacco: Never Used   Substance and Sexual Activity   • Alcohol use: No   • Drug use: No   • Sexual activity: Not on file       SURGICAL HISTORY   has a past surgical history that includes gyn surgery (1988) and ercp in or (N/A, 4/18/2019).    CURRENT MEDICATIONS  Home Medications     Reviewed by Janneth Peters R.N. (Registered Nurse) on 07/02/20 at 1638  Med List Status: <None>   Medication Last Dose Status   minocycline (DYNACIN) 100 MG tablet  Active   omeprazole (PRILOSEC) 20 MG CPDR  Active                ALLERGIES  Allergies   Allergen Reactions   • Nickel Rash and Itching     Per patient   • Nitrates      \"Tingling of the mouth\"   • Other Food Vomiting     Dairy       PHYSICAL EXAM  VITAL SIGNS: BP (!) 97/56   Pulse 78   Temp 37.3 °C (99.2 °F) (Temporal)   Resp 14   Ht 1.651 m (5' 5\")   Wt 54.9 kg (121 lb)   " SpO2 100%   BMI 20.14 kg/m²   Pulse ox interpretation: I interpret this pulse ox as normal.  Constitutional: Alert in no apparent distress.  HENT: No signs of trauma, Bilateral external ears normal, Nose normal.   Eyes: Pupils are equal and reactive, Conjunctiva normal, Non-icteric.   Neck: Normal range of motion, No tenderness, Supple, No stridor.   Cardiovascular: Regular rate and rhythm.   Thorax & Lungs: Normal breath sounds, No respiratory distress, No wheezing, No chest tenderness.   Abdomen: Bowel sounds normal, Soft, No tenderness, No masses, No pulsatile masses. No peritoneal signs.  Skin: Warm, Dry, No erythema, No rash.   Back: No bony tenderness, No CVA tenderness.   Extremities: Intact distal pulses, No edema, No tenderness, No cyanosis  Musculoskeletal: Good range of motion in all major joints. No tenderness to palpation or major deformities noted.   Neurologic: Alert, Normal motor function and gait, Normal sensory function, No focal deficits noted.   Psychiatric: Affect normal, Judgment normal, Mood normal.       DIAGNOSTIC STUDIES / PROCEDURES      LABS  Labs Reviewed   CBC WITH DIFFERENTIAL - Abnormal; Notable for the following components:       Result Value    RBC 2.78 (*)     Hemoglobin 9.1 (*)     Hematocrit 26.4 (*)     Lymphocytes 17.70 (*)     Lymphs (Absolute) 0.94 (*)     All other components within normal limits    Narrative:     Indicate which anticoagulants the patient is on:->NONE   COMP METABOLIC PANEL - Abnormal; Notable for the following components:    Sodium 121 (*)     Chloride 92 (*)     Co2 17 (*)     Glucose 104 (*)     Bun 44 (*)     Calcium 8.2 (*)     AST(SGOT) 169 (*)     ALT(SGPT) 80 (*)     Alkaline Phosphatase 479 (*)     Total Bilirubin 4.3 (*)     Albumin 2.7 (*)     Total Protein 5.2 (*)     All other components within normal limits    Narrative:     Indicate which anticoagulants the patient is on:->NONE   ESTIMATED GFR - Abnormal; Notable for the following components:     GFR If Non  54 (*)     All other components within normal limits    Narrative:     Indicate which anticoagulants the patient is on:->NONE   PROTHROMBIN TIME    Narrative:     Indicate which anticoagulants the patient is on:->NONE   FLUID CELL COUNT    Narrative:     Paracentesis fluid.       RADIOLOGY  No orders to display       COURSE & MEDICAL DECISION MAKING    Medications - No data to display    Pertinent Labs & Imaging studies reviewed. (See chart for details)  60 y.o. female with a history of end-stage liver disease and recurrent paracenteses for ascites presenting with abdominal pain.  Last paracentesis was approximately 3 days ago.  No fevers.  Has tense distended abdomen concerning for recurrent ascites.  Bedside ultrasound showing significant free fluid in the abdomen.  Laboratory studies were performed.  No leukocytosis.  Has known chronic liver disease.  The patient was encouraged to follow-up with a physician on an outpatient basis to help arrange scheduled outpatient IR paracentesis procedures so that she can avoid further visits to the emergency department for recurrent procedures.    The patient received a paracentesis here.  No evidence of SBP.  Ultimately had 6 L of yellow ascites fluid removed without complication.  Patient reports feeling much improved.  Recommending administration of albumin due to low blood pressure following large-volume paracentesis.  Patient insisted on leaving AGAINST MEDICAL ADVICE ultimately.  The patient left prior to complete medical evaluation.  She states that she simply wants to go home and does not want albumin or any further care.  She was informed regarding risks of foregoing further treatment and the patient did appear to be alert, awake, oriented and with capacity to make her own medical decisions.  Arrangement was provided for the patient to go home.  She is adamant that she does not want to stay any longer.    The patient happens to be  "hyponatremic as well.  Likely related to chronic liver disease.  She is fully oriented.  Again she is refusing further care and left AGAINST MEDICAL ADVICE prior to complete medical evaluation.    The patient was instructed to follow-up with primary care physician for further management.  To return immediately for any worsening symptoms or development of any other concerning signs or symptoms. The patient verbalizes understanding in their own words.    BP (!) 87/51   Pulse 66   Temp 37.3 °C (99.2 °F) (Temporal)   Resp 18   Ht 1.651 m (5' 5\")   Wt 54.9 kg (121 lb)   SpO2 100%   BMI 20.14 kg/m²     The patient was referred to primary care where they will receive further BP management.      [facility], Emergency Dept  [address] [address] [phone]    As needed, If symptoms worsen    Primary care doctor    Schedule an appointment as soon as possible for a visit         FINAL IMPRESSION  1. Other ascites    2. S/P abdominal paracentesis    3. Weakness    4.      End-stage liver disease  5.      Hyponatremia      Electronically signed by: Leonid Peraza M.D., 7/2/2020 4:50 PM    "

## 2020-07-03 NOTE — ED NOTES
Halina Abbott patient has chosen to leave the hospital against medical advice. The attending physician has not discharged the patient. Patient is not a risk to himself or others. I have discussed with the patient the following:  Physician has not determined patient is ready for discharge, Risks and consequences of leaving the hospital too soon and Benefit of continued hospitalization.      Discharge against medical advice form has been Signed.      Attending physician has been notified.      Pt was wheeled to the lobby. Pt says that EMS was suppose to bring her walker in w/ her. No walker noted in room attempted to call dispatch. Ambulance is off shift. Pt provided phone number and medic number and informed to call in the morning.

## 2020-07-03 NOTE — ED NOTES
"Pt wheeled back to room because pt is unable to transfer from a car into her house or move around her house.  involved attempted to contact pt's daughter. Pt's daughter does not have a car to  pt and her \"house is too small.\"  informed to place pt back in room for potential legal hold. Dr. Peraza spoke w/ pt and pt is able to ambulate w/ a walker. Pt provided walker and pt walked around unit w/ steady gait and standby assistance. Notified . Pt ok to go home. Dr. Peraza placed order in for walker.    "

## 2020-07-03 NOTE — ED NOTES
Received report on patient. Patient is resting in bed, has no complaints of pain or indications of distress. Paracentesis bottles brought up and attached by physician. Pt still draining. No other needs at this time.    No

## 2020-07-08 ENCOUNTER — HOSPITAL ENCOUNTER (EMERGENCY)
Dept: HOSPITAL 8 - ED | Age: 60
Discharge: HOME | End: 2020-07-08
Payer: MEDICAID

## 2020-07-08 VITALS — WEIGHT: 121.25 LBS | HEIGHT: 65 IN | BODY MASS INDEX: 20.2 KG/M2

## 2020-07-08 VITALS — SYSTOLIC BLOOD PRESSURE: 97 MMHG | DIASTOLIC BLOOD PRESSURE: 56 MMHG

## 2020-07-08 DIAGNOSIS — R10.84: ICD-10-CM

## 2020-07-08 DIAGNOSIS — K73.2: ICD-10-CM

## 2020-07-08 DIAGNOSIS — K70.31: Primary | ICD-10-CM

## 2020-07-08 PROCEDURE — 49083 ABD PARACENTESIS W/IMAGING: CPT

## 2020-07-08 PROCEDURE — 99285 EMERGENCY DEPT VISIT HI MDM: CPT

## 2020-07-08 NOTE — NUR
PT PRESENTS TO ED SEEKING PARACENTESIS, PT HAS AUTOIMMUNE HEPATITIS REQUIRING 
FREQUENT PARACENTESIS, LAST SEVERAL DAYS AGO. PT DENIES SYMPTOMS OTHER THAN 
ASCITES. PT ATTACHED TO BP AND SPO2 MONITORS, CALL LIGHT IN REACH. PT DRESSED 
IN GOWN, BLANKET PROVIDED. PT AWAITING IR FOR PARACENTESIS.

## 2020-07-08 NOTE — NUR
SEUN Saenz notified bp 85/49, md instructed RN to ambulate pt then recheck 
bp. recheck after ambulation was 97/56, pt denies sx. md gave rn instructions 
to dc pt. pt reports she is feeling much better than when she arrived. pt given 
wc escort to dc desk, dc'd with own walker. pt given cab voucher. pt a&o, resps 
even and unlabored, pt given dc instructions. trena at MA.

## 2020-07-14 ENCOUNTER — HOSPITAL ENCOUNTER (EMERGENCY)
Dept: HOSPITAL 8 - ED | Age: 60
End: 2020-07-14
Payer: MEDICAID

## 2020-07-14 VITALS — HEIGHT: 65 IN | WEIGHT: 202.83 LBS | BODY MASS INDEX: 33.79 KG/M2

## 2020-07-14 VITALS — SYSTOLIC BLOOD PRESSURE: 90 MMHG | DIASTOLIC BLOOD PRESSURE: 57 MMHG

## 2020-07-14 DIAGNOSIS — E87.6: ICD-10-CM

## 2020-07-14 DIAGNOSIS — I95.9: ICD-10-CM

## 2020-07-14 DIAGNOSIS — R18.8: ICD-10-CM

## 2020-07-14 DIAGNOSIS — E87.1: ICD-10-CM

## 2020-07-14 DIAGNOSIS — K73.9: Primary | ICD-10-CM

## 2020-07-14 PROCEDURE — 99285 EMERGENCY DEPT VISIT HI MDM: CPT

## 2020-07-14 PROCEDURE — 49083 ABD PARACENTESIS W/IMAGING: CPT

## 2020-07-14 NOTE — NUR
-------------------------------------------------------------------------------

           *** Note undone in EDM - 07/14/20 at 0856 by CHRIS ***            

-------------------------------------------------------------------------------

PT STATE MUCH PAIN RELIEF POST TORADOL ADMIN, PT NOW RESTING COMFORTABLY ON 
GURNY, PREVIOUSLY WAS NOT ABLE TO LAY BACK ON GURNEY, SAT AT THE EDGE 
GRIMICING. PT STATES "WHAT WAS THAT STUFF IT WORKED GREAT"

## 2020-07-14 NOTE — NUR
PT WITH C/O ABD PAIN DISCOMFORT 4/10, DIFFUSE, NO REBOUND. STATES PAIN IS "FROM 
ALL THE STRECTHING", PT WITH TAUT ACITIC ABD ON EXAM. PT WITH LIVER FAILURE, 
REQUIRES FREQUENT PARACENTESIS APPROX Q 3 DAYS. PT WITH HX OF AUTOIMMUNE LIVER 
FAILURE, STATES IT HAS BEEN GOING ON FOR YRS, DENIES HAVING GI MD. 



PT TO BP, CONT PULSE OX.

## 2020-07-19 ENCOUNTER — HOSPITAL ENCOUNTER (EMERGENCY)
Dept: HOSPITAL 8 - ED | Age: 60
Discharge: HOME | End: 2020-07-19
Payer: MEDICAID

## 2020-07-19 VITALS — WEIGHT: 121.25 LBS | HEIGHT: 65 IN | BODY MASS INDEX: 20.2 KG/M2

## 2020-07-19 VITALS — SYSTOLIC BLOOD PRESSURE: 89 MMHG | DIASTOLIC BLOOD PRESSURE: 52 MMHG

## 2020-07-19 DIAGNOSIS — K74.60: Primary | ICD-10-CM

## 2020-07-19 DIAGNOSIS — R06.02: ICD-10-CM

## 2020-07-19 DIAGNOSIS — R94.31: ICD-10-CM

## 2020-07-19 DIAGNOSIS — R18.8: ICD-10-CM

## 2020-07-19 LAB
<PLATELET ESTIMATE>: ADEQUATE
<PLT MORPHOLOGY>: (no result)
ALBUMIN SERPL-MCNC: 1.9 G/DL (ref 3.4–5)
ALP SERPL-CCNC: 680 U/L (ref 45–117)
ALT SERPL-CCNC: 118 U/L (ref 12–78)
ANION GAP SERPL CALC-SCNC: 8 MMOL/L (ref 5–15)
ANISOCYTOSIS BLD QL SMEAR: (no result)
APTT BLD: 30 SECONDS (ref 25–31)
BAND#(MANUAL): 0.06 X10^3/UL
BILIRUB SERPL-MCNC: 3.9 MG/DL (ref 0.2–1)
CALCIUM SERPL-MCNC: 8.4 MG/DL (ref 8.5–10.1)
CHLORIDE SERPL-SCNC: 100 MMOL/L (ref 98–107)
CREAT SERPL-MCNC: 1.34 MG/DL (ref 0.55–1.02)
ERYTHROCYTE [DISTWIDTH] IN BLOOD BY AUTOMATED COUNT: 15.1 % (ref 9.6–15.2)
HYPOCHROMIA BLD QL SMEAR: (no result)
INR PPP: 1.01 (ref 0.93–1.1)
LYMPH#(MANUAL): 0.41 X10^3/UL (ref 1–3.4)
LYMPHS% (MANUAL): 7 % (ref 22–44)
MCH RBC QN AUTO: 31.8 PG (ref 27–34.8)
MCHC RBC AUTO-ENTMCNC: 33.3 G/DL (ref 32.4–35.8)
MCV RBC AUTO: 95.5 FL (ref 80–100)
MD: YES
MONOS#(MANUAL): 0.58 X10^3/UL (ref 0.3–2.7)
MONOS% (MANUAL): 10 % (ref 2–9)
NEUTS BAND NFR BLD: 1 % (ref 0–7)
PLATELET # BLD AUTO: 334 X10^3/UL (ref 130–400)
PMV BLD AUTO: 7.6 FL (ref 7.4–10.4)
POLYCHROMASIA BLD QL SMEAR: (no result)
PROT SERPL-MCNC: 5.7 G/DL (ref 6.4–8.2)
PROTHROMBIN TIME: 10.7 SECONDS (ref 9.6–11.5)
RBC # BLD AUTO: 3.3 X10^6/UL (ref 3.82–5.3)
SEG#(MANUAL): 4.76 X10^3/UL (ref 1.8–6.8)
SEGS% (MANUAL): 82 % (ref 42–75)

## 2020-07-19 PROCEDURE — 85025 COMPLETE CBC W/AUTO DIFF WBC: CPT

## 2020-07-19 PROCEDURE — 36415 COLL VENOUS BLD VENIPUNCTURE: CPT

## 2020-07-19 PROCEDURE — 93005 ELECTROCARDIOGRAM TRACING: CPT

## 2020-07-19 PROCEDURE — 99285 EMERGENCY DEPT VISIT HI MDM: CPT

## 2020-07-19 PROCEDURE — 85730 THROMBOPLASTIN TIME PARTIAL: CPT

## 2020-07-19 PROCEDURE — 49083 ABD PARACENTESIS W/IMAGING: CPT

## 2020-07-19 PROCEDURE — 85610 PROTHROMBIN TIME: CPT

## 2020-07-19 PROCEDURE — 80053 COMPREHEN METABOLIC PANEL: CPT

## 2020-07-19 NOTE — NUR
Patient is resting comfortably in bed, no c/o pain, patient anxious about 
getting paracentesis, no further needs at this time.

## 2020-07-19 NOTE — NUR
Patient BIB [facility] due to increase Ascitic fluid in abdomen, last paracentesis 
4-5 days ago at which time 6 Liters were removed.  Patient is A&Ox4, resting 
comfortably in bed, no c/o of pain, no further needs at this time.

## 2020-07-26 ENCOUNTER — HOSPITAL ENCOUNTER (EMERGENCY)
Dept: HOSPITAL 8 - ED | Age: 60
Discharge: HOME | End: 2020-07-26
Payer: MEDICAID

## 2020-07-26 VITALS — BODY MASS INDEX: 20.2 KG/M2 | HEIGHT: 65 IN | WEIGHT: 121.25 LBS

## 2020-07-26 VITALS — DIASTOLIC BLOOD PRESSURE: 47 MMHG | SYSTOLIC BLOOD PRESSURE: 86 MMHG

## 2020-07-26 DIAGNOSIS — R18.8: Primary | ICD-10-CM

## 2020-07-26 DIAGNOSIS — R10.84: ICD-10-CM

## 2020-07-26 PROCEDURE — 49083 ABD PARACENTESIS W/IMAGING: CPT

## 2020-07-26 PROCEDURE — 99285 EMERGENCY DEPT VISIT HI MDM: CPT

## 2020-07-26 NOTE — NUR
pt biba for paracentesis of ascites, chronic in nature. pt states last 
paracentesis was approx 1 week ago, pt has been released from service at her GI 
specialist as she has missed too many appts d/t transportation barrier. pt is 
a&o, resps even and unlabored. pt speaking in full sentences without 
difficulty. bp and spo2 monitors in place. call light in reach. awaiting 
provider and dispo.

## 2020-07-26 NOTE — NUR
PARACENTESIS COMPLETE, VS REASSESSED. LENNY SILVEIRA NOTIFIED BP 85/47, NO ORDERS 
RECEIVED. SOCIAL WORK CONSULT REQUESTED TO ASSIST PT IN ESTABLISHING TRANSPORT 
TO GI FOLLOW UP APPTS. LENNY FLOWERS'RAS RN TO DC PT ONCE SW HAS SPOKEN TO PT.

## 2020-07-26 NOTE — NUR
md hanna notified pt's bp is 86/47, MD instructed RN to provide pt with PO 
fluids and recheck. Pt took two sips of water and refused anymore, pt refuses 
to stay for monitoring, requesting discharge. pt given dc instructions and 
script, ambulatory with steady gait, given wc escort to dc. pt had own walker 
at dc. pt a&o, resps even and unlabored, nadn at dc.

## 2020-07-30 ENCOUNTER — HOSPITAL ENCOUNTER (EMERGENCY)
Dept: HOSPITAL 8 - ED | Age: 60
Discharge: HOME | End: 2020-07-30
Payer: MEDICAID

## 2020-07-30 VITALS — DIASTOLIC BLOOD PRESSURE: 52 MMHG | SYSTOLIC BLOOD PRESSURE: 89 MMHG

## 2020-07-30 VITALS — BODY MASS INDEX: 20.2 KG/M2 | HEIGHT: 65 IN | WEIGHT: 121.25 LBS

## 2020-07-30 DIAGNOSIS — R10.84: ICD-10-CM

## 2020-07-30 DIAGNOSIS — K74.60: Primary | ICD-10-CM

## 2020-07-30 PROCEDURE — 99285 EMERGENCY DEPT VISIT HI MDM: CPT

## 2020-07-30 PROCEDURE — 49083 ABD PARACENTESIS W/IMAGING: CPT

## 2020-08-05 ENCOUNTER — HOSPITAL ENCOUNTER (EMERGENCY)
Dept: HOSPITAL 8 - ED | Age: 60
Discharge: HOME | End: 2020-08-05
Payer: MEDICAID

## 2020-08-05 VITALS — DIASTOLIC BLOOD PRESSURE: 55 MMHG | SYSTOLIC BLOOD PRESSURE: 84 MMHG

## 2020-08-05 VITALS — WEIGHT: 127.87 LBS | BODY MASS INDEX: 21.3 KG/M2 | HEIGHT: 65 IN

## 2020-08-05 DIAGNOSIS — K74.60: Primary | ICD-10-CM

## 2020-08-05 DIAGNOSIS — R10.84: ICD-10-CM

## 2020-08-05 DIAGNOSIS — R18.8: ICD-10-CM

## 2020-08-05 PROCEDURE — 99285 EMERGENCY DEPT VISIT HI MDM: CPT

## 2020-08-05 PROCEDURE — 49083 ABD PARACENTESIS W/IMAGING: CPT

## 2020-08-05 NOTE — NUR
BIB [facility] FOR ABD SWELLING RELATED TO PSIRRROSIS OF LIVER. PT RECENTLY HAD 
PARACENTISIS 2-3 DAYS PRIOR AND REMOVED 6 LITERS. PT STATED SHE IS HER FOR 
ANOTHER PARACENTISIS.

## 2020-08-10 ENCOUNTER — HOSPITAL ENCOUNTER (EMERGENCY)
Dept: HOSPITAL 8 - ED | Age: 60
Discharge: LEFT BEFORE DISCHARGE | End: 2020-08-10
Payer: MEDICAID

## 2020-08-10 VITALS — WEIGHT: 149.91 LBS | HEIGHT: 62 IN | BODY MASS INDEX: 27.59 KG/M2

## 2020-08-10 VITALS — SYSTOLIC BLOOD PRESSURE: 89 MMHG | DIASTOLIC BLOOD PRESSURE: 53 MMHG

## 2020-08-10 DIAGNOSIS — I95.9: ICD-10-CM

## 2020-08-10 DIAGNOSIS — K70.31: Primary | ICD-10-CM

## 2020-08-10 PROCEDURE — P9047 ALBUMIN (HUMAN), 25%, 50ML: HCPCS

## 2020-08-10 PROCEDURE — 99285 EMERGENCY DEPT VISIT HI MDM: CPT

## 2020-08-10 PROCEDURE — 96366 THER/PROPH/DIAG IV INF ADDON: CPT

## 2020-08-10 PROCEDURE — 96365 THER/PROPH/DIAG IV INF INIT: CPT

## 2020-08-10 PROCEDURE — 49083 ABD PARACENTESIS W/IMAGING: CPT

## 2020-08-10 NOTE — NUR
REPORT TO ANTHONY MOREL. MADE AWARE PT NEEDS TO SIGN AMA PAPER WORK AS SHE WANTS TO 
GO HOME INSTEAD OF ADMISSION. ALBUMIN INFUSING AT THIS TIME

## 2020-08-10 NOTE — NUR
RETURNED FROM IR. INFORMED THAT 9 LITERS REMOVED. DESPITE ALBUMIN GIVEN BP 
LOWER ON RETURN. MD MADE AWARE AND ORDER RECEIVED FOR ADDITIONAL ALBUMIN

## 2020-08-17 ENCOUNTER — HOSPITAL ENCOUNTER (EMERGENCY)
Dept: HOSPITAL 8 - ED | Age: 60
Discharge: HOME | End: 2020-08-17
Payer: MEDICAID

## 2020-08-17 VITALS — HEIGHT: 65 IN | WEIGHT: 105.82 LBS | BODY MASS INDEX: 17.63 KG/M2

## 2020-08-17 VITALS — DIASTOLIC BLOOD PRESSURE: 52 MMHG | SYSTOLIC BLOOD PRESSURE: 86 MMHG

## 2020-08-17 DIAGNOSIS — K74.60: ICD-10-CM

## 2020-08-17 DIAGNOSIS — I95.0: ICD-10-CM

## 2020-08-17 DIAGNOSIS — K75.4: ICD-10-CM

## 2020-08-17 DIAGNOSIS — R18.8: Primary | ICD-10-CM

## 2020-08-17 DIAGNOSIS — K75.89: ICD-10-CM

## 2020-08-17 PROCEDURE — 49083 ABD PARACENTESIS W/IMAGING: CPT

## 2020-08-17 PROCEDURE — 99285 EMERGENCY DEPT VISIT HI MDM: CPT

## 2020-08-17 NOTE — NUR
Patient given discharge instructions and they have confirmed that they 
understand the instructions.  Patient ambulatory with steady gait. pt stated 
'my bp is always low." edmd notified hypotensive at [place].

## 2020-08-17 NOTE — NUR
BIBA. REPORT RECEIVED FROM EMS. PT HERE FOR PARACENTESIS D/T ASCITIS. DENIES 
ABD PAIN/N/V/D. PT'S AOX4. RESPS EVEN AND UNLABORED. BP/SPO2 MONITORS IN PLACE. 
CALL LIGHT WITHIN REACH.

## 2020-08-26 ENCOUNTER — HOSPITAL ENCOUNTER (EMERGENCY)
Dept: HOSPITAL 8 - ED | Age: 60
End: 2020-08-26
Payer: MEDICAID

## 2020-08-26 VITALS — BODY MASS INDEX: 22.03 KG/M2 | HEIGHT: 63 IN | WEIGHT: 124.34 LBS

## 2020-08-26 VITALS — DIASTOLIC BLOOD PRESSURE: 48 MMHG | SYSTOLIC BLOOD PRESSURE: 85 MMHG

## 2020-08-26 DIAGNOSIS — R10.84: ICD-10-CM

## 2020-08-26 DIAGNOSIS — K70.30: Primary | ICD-10-CM

## 2020-08-26 PROCEDURE — 49083 ABD PARACENTESIS W/IMAGING: CPT

## 2020-08-26 PROCEDURE — 99285 EMERGENCY DEPT VISIT HI MDM: CPT

## 2020-08-26 NOTE — NUR
TASK RN: VITALS TAKEN, PT WITH BP 82/48. ERMD UPDATED, PT NORMALLY RUNNING LOW 
SBPS 90S. NO ORDERS RECIEVED. PT ASYMPTOMATIC

## 2020-08-26 NOTE — NUR
PT BIB [facility] FOR "MY ASCITES." PT REPORTS THAT SHE HAS HER BELLY TAPPED EVERY 5 
DAYS. PT REPORTS HX OF AUTOIMMUNE HEPATITIS AND HAS NEVER DRANK ALCOHOL 
HABITUALLY.  PT ON MONITOR. ARY MARTIN WAS AT BEDSIDE. PT DENIES N/V/D OR ANY 
ABDOMINAL PAIN OR SOB.

## 2020-08-28 ENCOUNTER — HOSPITAL ENCOUNTER (INPATIENT)
Dept: HOSPITAL 8 - ED | Age: 60
LOS: 11 days | Discharge: HOSPICE | DRG: 469 | End: 2020-09-08
Attending: STUDENT IN AN ORGANIZED HEALTH CARE EDUCATION/TRAINING PROGRAM | Admitting: HOSPITALIST
Payer: MEDICAID

## 2020-08-28 VITALS — HEIGHT: 65 IN | WEIGHT: 145.95 LBS | BODY MASS INDEX: 24.32 KG/M2

## 2020-08-28 VITALS — DIASTOLIC BLOOD PRESSURE: 52 MMHG | SYSTOLIC BLOOD PRESSURE: 85 MMHG

## 2020-08-28 DIAGNOSIS — Z66: ICD-10-CM

## 2020-08-28 DIAGNOSIS — E86.1: ICD-10-CM

## 2020-08-28 DIAGNOSIS — E87.6: ICD-10-CM

## 2020-08-28 DIAGNOSIS — E43: ICD-10-CM

## 2020-08-28 DIAGNOSIS — I08.1: ICD-10-CM

## 2020-08-28 DIAGNOSIS — K72.00: ICD-10-CM

## 2020-08-28 DIAGNOSIS — J90: ICD-10-CM

## 2020-08-28 DIAGNOSIS — K72.10: ICD-10-CM

## 2020-08-28 DIAGNOSIS — Z79.899: ICD-10-CM

## 2020-08-28 DIAGNOSIS — N39.0: ICD-10-CM

## 2020-08-28 DIAGNOSIS — Z51.5: ICD-10-CM

## 2020-08-28 DIAGNOSIS — D64.9: ICD-10-CM

## 2020-08-28 DIAGNOSIS — K74.60: ICD-10-CM

## 2020-08-28 DIAGNOSIS — K26.4: ICD-10-CM

## 2020-08-28 DIAGNOSIS — I10: ICD-10-CM

## 2020-08-28 DIAGNOSIS — G93.41: ICD-10-CM

## 2020-08-28 DIAGNOSIS — K75.4: ICD-10-CM

## 2020-08-28 DIAGNOSIS — K76.7: ICD-10-CM

## 2020-08-28 DIAGNOSIS — Z83.49: ICD-10-CM

## 2020-08-28 DIAGNOSIS — E83.51: ICD-10-CM

## 2020-08-28 DIAGNOSIS — E87.5: ICD-10-CM

## 2020-08-28 DIAGNOSIS — E22.2: ICD-10-CM

## 2020-08-28 DIAGNOSIS — Z91.19: ICD-10-CM

## 2020-08-28 DIAGNOSIS — B95.2: ICD-10-CM

## 2020-08-28 DIAGNOSIS — E87.2: ICD-10-CM

## 2020-08-28 DIAGNOSIS — Z74.01: ICD-10-CM

## 2020-08-28 DIAGNOSIS — N17.0: Primary | ICD-10-CM

## 2020-08-28 DIAGNOSIS — E87.70: ICD-10-CM

## 2020-08-28 LAB
ALBUMIN SERPL-MCNC: 1.8 G/DL (ref 3.4–5)
ALP SERPL-CCNC: 505 U/L (ref 45–117)
ALT SERPL-CCNC: 116 U/L (ref 12–78)
ANION GAP SERPL CALC-SCNC: 8 MMOL/L (ref 5–15)
BASOPHILS # BLD AUTO: 0.02 X10^3/UL (ref 0–0.1)
BASOPHILS NFR BLD AUTO: 0 % (ref 0–1)
BILIRUB SERPL-MCNC: 9.5 MG/DL (ref 0.2–1)
CALCIUM SERPL-MCNC: 8.5 MG/DL (ref 8.5–10.1)
CHLORIDE SERPL-SCNC: 99 MMOL/L (ref 98–107)
CREAT SERPL-MCNC: 2.54 MG/DL (ref 0.55–1.02)
EOSINOPHIL # BLD AUTO: 0 X10^3/UL (ref 0–0.4)
EOSINOPHIL NFR BLD AUTO: 0 % (ref 1–7)
ERYTHROCYTE [DISTWIDTH] IN BLOOD BY AUTOMATED COUNT: 19.5 % (ref 9.6–15.2)
INR PPP: 1.13 (ref 0.93–1.1)
LYMPHOCYTES # BLD AUTO: 1.54 X10^3/UL (ref 1–3.4)
LYMPHOCYTES NFR BLD AUTO: 19 % (ref 22–44)
MCH RBC QN AUTO: 30.5 PG (ref 27–34.8)
MCHC RBC AUTO-ENTMCNC: 33.2 G/DL (ref 32.4–35.8)
MCV RBC AUTO: 92 FL (ref 80–100)
MD: NO
MICROSCOPIC: (no result)
MONOCYTES # BLD AUTO: 0.37 X10^3/UL (ref 0.2–0.8)
MONOCYTES NFR BLD AUTO: 5 % (ref 2–9)
NEUTROPHILS # BLD AUTO: 6.28 X10^3/UL (ref 1.8–6.8)
NEUTROPHILS NFR BLD AUTO: 77 % (ref 42–75)
PLATELET # BLD AUTO: 274 X10^3/UL (ref 130–400)
PMV BLD AUTO: 6.8 FL (ref 7.4–10.4)
PROT SERPL-MCNC: 5.1 G/DL (ref 6.4–8.2)
PROTHROMBIN TIME: 11.7 SECONDS (ref 9.6–11.5)
RBC # BLD AUTO: 3.11 X10^6/UL (ref 3.82–5.3)

## 2020-08-28 PROCEDURE — 81001 URINALYSIS AUTO W/SCOPE: CPT

## 2020-08-28 PROCEDURE — 82140 ASSAY OF AMMONIA: CPT

## 2020-08-28 PROCEDURE — 86850 RBC ANTIBODY SCREEN: CPT

## 2020-08-28 PROCEDURE — 36573 INSJ PICC RS&I 5 YR+: CPT

## 2020-08-28 PROCEDURE — 71045 X-RAY EXAM CHEST 1 VIEW: CPT

## 2020-08-28 PROCEDURE — 85018 HEMOGLOBIN: CPT

## 2020-08-28 PROCEDURE — 85610 PROTHROMBIN TIME: CPT

## 2020-08-28 PROCEDURE — 82962 GLUCOSE BLOOD TEST: CPT

## 2020-08-28 PROCEDURE — 84295 ASSAY OF SERUM SODIUM: CPT

## 2020-08-28 PROCEDURE — 84133 ASSAY OF URINE POTASSIUM: CPT

## 2020-08-28 PROCEDURE — 84443 ASSAY THYROID STIM HORMONE: CPT

## 2020-08-28 PROCEDURE — 84550 ASSAY OF BLOOD/URIC ACID: CPT

## 2020-08-28 PROCEDURE — 83935 ASSAY OF URINE OSMOLALITY: CPT

## 2020-08-28 PROCEDURE — 83970 ASSAY OF PARATHORMONE: CPT

## 2020-08-28 PROCEDURE — C1751 CATH, INF, PER/CENT/MIDLINE: HCPCS

## 2020-08-28 PROCEDURE — 76770 US EXAM ABDO BACK WALL COMP: CPT

## 2020-08-28 PROCEDURE — 85014 HEMATOCRIT: CPT

## 2020-08-28 PROCEDURE — 84132 ASSAY OF SERUM POTASSIUM: CPT

## 2020-08-28 PROCEDURE — 85025 COMPLETE CBC W/AUTO DIFF WBC: CPT

## 2020-08-28 PROCEDURE — 93005 ELECTROCARDIOGRAM TRACING: CPT

## 2020-08-28 PROCEDURE — 82330 ASSAY OF CALCIUM: CPT

## 2020-08-28 PROCEDURE — 96375 TX/PRO/DX INJ NEW DRUG ADDON: CPT

## 2020-08-28 PROCEDURE — 84145 PROCALCITONIN (PCT): CPT

## 2020-08-28 PROCEDURE — 83550 IRON BINDING TEST: CPT

## 2020-08-28 PROCEDURE — 93306 TTE W/DOPPLER COMPLETE: CPT

## 2020-08-28 PROCEDURE — 83690 ASSAY OF LIPASE: CPT

## 2020-08-28 PROCEDURE — 83605 ASSAY OF LACTIC ACID: CPT

## 2020-08-28 PROCEDURE — 86140 C-REACTIVE PROTEIN: CPT

## 2020-08-28 PROCEDURE — 87077 CULTURE AEROBIC IDENTIFY: CPT

## 2020-08-28 PROCEDURE — 86902 BLOOD TYPE ANTIGEN DONOR EA: CPT

## 2020-08-28 PROCEDURE — 82728 ASSAY OF FERRITIN: CPT

## 2020-08-28 PROCEDURE — 82570 ASSAY OF URINE CREATININE: CPT

## 2020-08-28 PROCEDURE — 87081 CULTURE SCREEN ONLY: CPT

## 2020-08-28 PROCEDURE — P9047 ALBUMIN (HUMAN), 25%, 50ML: HCPCS

## 2020-08-28 PROCEDURE — 83540 ASSAY OF IRON: CPT

## 2020-08-28 PROCEDURE — 80069 RENAL FUNCTION PANEL: CPT

## 2020-08-28 PROCEDURE — 83930 ASSAY OF BLOOD OSMOLALITY: CPT

## 2020-08-28 PROCEDURE — 82274 ASSAY TEST FOR BLOOD FECAL: CPT

## 2020-08-28 PROCEDURE — 86922 COMPATIBILITY TEST ANTIGLOB: CPT

## 2020-08-28 PROCEDURE — 76705 ECHO EXAM OF ABDOMEN: CPT

## 2020-08-28 PROCEDURE — 80053 COMPREHEN METABOLIC PANEL: CPT

## 2020-08-28 PROCEDURE — 84300 ASSAY OF URINE SODIUM: CPT

## 2020-08-28 PROCEDURE — 82436 ASSAY OF URINE CHLORIDE: CPT

## 2020-08-28 PROCEDURE — 96361 HYDRATE IV INFUSION ADD-ON: CPT

## 2020-08-28 PROCEDURE — P9016 RBC LEUKOCYTES REDUCED: HCPCS

## 2020-08-28 PROCEDURE — 96374 THER/PROPH/DIAG INJ IV PUSH: CPT

## 2020-08-28 PROCEDURE — 83735 ASSAY OF MAGNESIUM: CPT

## 2020-08-28 PROCEDURE — 86870 RBC ANTIBODY IDENTIFICATION: CPT

## 2020-08-28 PROCEDURE — 80048 BASIC METABOLIC PNL TOTAL CA: CPT

## 2020-08-28 PROCEDURE — 82306 VITAMIN D 25 HYDROXY: CPT

## 2020-08-28 PROCEDURE — 87186 SC STD MICRODIL/AGAR DIL: CPT

## 2020-08-28 PROCEDURE — 87040 BLOOD CULTURE FOR BACTERIA: CPT

## 2020-08-28 PROCEDURE — 86900 BLOOD TYPING SEROLOGIC ABO: CPT

## 2020-08-28 PROCEDURE — 87086 URINE CULTURE/COLONY COUNT: CPT

## 2020-08-28 PROCEDURE — 86923 COMPATIBILITY TEST ELECTRIC: CPT

## 2020-08-28 PROCEDURE — P9045 ALBUMIN (HUMAN), 5%, 250 ML: HCPCS

## 2020-08-28 PROCEDURE — 70450 CT HEAD/BRAIN W/O DYE: CPT

## 2020-08-28 PROCEDURE — 82533 TOTAL CORTISOL: CPT

## 2020-08-28 PROCEDURE — 36415 COLL VENOUS BLD VENIPUNCTURE: CPT

## 2020-08-28 PROCEDURE — 84100 ASSAY OF PHOSPHORUS: CPT

## 2020-08-28 RX ADMIN — LACTOBACILLUS TAB SCH TAB: TAB at 21:50

## 2020-08-28 RX ADMIN — CALCIUM CARBONATE-CHOLECALCIFEROL TAB 250 MG-125 UNIT SCH TAB: 250-125 TAB at 21:50

## 2020-08-28 RX ADMIN — LEVETIRACETAM SCH MG: 500 TABLET ORAL at 21:50

## 2020-08-28 RX ADMIN — ALBUMIN (HUMAN) SCH MLS/HR: 25 SOLUTION INTRAVENOUS at 21:51

## 2020-08-28 RX ADMIN — FERROUS SULFATE TAB 325 MG (65 MG ELEMENTAL FE) SCH MG: 325 (65 FE) TAB at 22:04

## 2020-08-28 NOTE — NUR
BIB [facility] FROM HOME. PT C/O GENERAL FATIGUE. PT HAS NOT EATEN IN A COUPLE DAYS, 
DAUGHTER USUALLY BRINGS PT FOOD. HX: HYPOTENSION, CIRRHOSIS, SEIZURES. MEDS: 
KEPPRA. PTA [facility]: BP 84/56, PT STATES THIS IS HER BASELINE.

PT STATES SHE HAS BEEN DRINKNG WATER EVERY DAY. TASK RN AT BEDSIDE FOR PIV 
PLACEMENT AND LAB DRAW. PT CONNECTED TO MONITORING. PT STATES SHE FEELS ALL 
OVER FATIGUE AND HA.

## 2020-08-28 NOTE — NUR
PIV PLACED BY TASK RN USING US. IVF RUNNING AS PER MAR. URINE COLLECTED VIA 
STRAIGHT CATH AND TAKEN TO LAB. LAB AT BEDSIDE FOR SECOND SET BLOOD CX.

## 2020-08-29 VITALS — DIASTOLIC BLOOD PRESSURE: 55 MMHG | SYSTOLIC BLOOD PRESSURE: 85 MMHG

## 2020-08-29 VITALS — SYSTOLIC BLOOD PRESSURE: 83 MMHG | DIASTOLIC BLOOD PRESSURE: 49 MMHG

## 2020-08-29 VITALS — DIASTOLIC BLOOD PRESSURE: 55 MMHG | SYSTOLIC BLOOD PRESSURE: 93 MMHG

## 2020-08-29 VITALS — DIASTOLIC BLOOD PRESSURE: 51 MMHG | SYSTOLIC BLOOD PRESSURE: 82 MMHG

## 2020-08-29 LAB
<PLATELET ESTIMATE>: ADEQUATE
ALBUMIN SERPL-MCNC: 1.8 G/DL (ref 3.4–5)
ALP SERPL-CCNC: 485 U/L (ref 45–117)
ALT SERPL-CCNC: 113 U/L (ref 12–78)
ANION GAP SERPL CALC-SCNC: 10 MMOL/L (ref 5–15)
ANION GAP SERPL CALC-SCNC: 11 MMOL/L (ref 5–15)
ANISOCYTOSIS BLD QL SMEAR: (no result)
BILIRUB SERPL-MCNC: 8.5 MG/DL (ref 0.2–1)
CALCIUM SERPL-MCNC: 7.1 MG/DL (ref 8.5–10.1)
CALCIUM SERPL-MCNC: 8.1 MG/DL (ref 8.5–10.1)
CHLORIDE SERPL-SCNC: 102 MMOL/L (ref 98–107)
CHLORIDE SERPL-SCNC: 96 MMOL/L (ref 98–107)
CHLORIDE,URINE RANDOM: 88 MMOL/L
CREAT SERPL-MCNC: 2.27 MG/DL (ref 0.55–1.02)
CREAT SERPL-MCNC: 2.5 MG/DL (ref 0.55–1.02)
EOS#(MANUAL): 0.08 X10^3/UL (ref 0–0.4)
EOS% (MANUAL): 1 % (ref 1–7)
ERYTHROCYTE [DISTWIDTH] IN BLOOD BY AUTOMATED COUNT: 19.8 % (ref 9.6–15.2)
HBV SURFACE AB SER RIA-ACNC: 35 MG/DL
INR PPP: 1.17 (ref 0.93–1.1)
LYMPH#(MANUAL): 0.75 X10^3/UL (ref 1–3.4)
LYMPHS% (MANUAL): 9 % (ref 22–44)
MACROCYTES BLD QL SMEAR: (no result)
MCH RBC QN AUTO: 31.8 PG (ref 27–34.8)
MCHC RBC AUTO-ENTMCNC: 34.1 G/DL (ref 32.4–35.8)
MCV RBC AUTO: 93.4 FL (ref 80–100)
MD: YES
MICROCYTES BLD QL SMEAR: (no result)
MONOS#(MANUAL): 0.58 X10^3/UL (ref 0.3–2.7)
MONOS% (MANUAL): 7 % (ref 2–9)
OSMOLALITY,URINE: 311 MOSM/KG (ref 500–850)
OVALOCYTES BLD QL SMEAR: (no result)
PLATELET # BLD AUTO: 264 X10^3/UL (ref 130–400)
PMV BLD AUTO: 7 FL (ref 7.4–10.4)
POLYCHROMASIA BLD QL SMEAR: (no result)
POTASSIUM UR-SCNC: 32 MMOL/L
PROT SERPL-MCNC: 4.8 G/DL (ref 6.4–8.2)
PROTHROMBIN TIME: 12.1 SECONDS (ref 9.6–11.5)
RBC # BLD AUTO: 3.17 X10^6/UL (ref 3.82–5.3)
SEG#(MANUAL): 6.89 X10^3/UL (ref 1.8–6.8)
SEGS% (MANUAL): 83 % (ref 42–75)
SMALL PLATELETS BLD QL SMEAR: (no result)
SODIUM,URINE RANDOM: 48 MMOL/L
TOXIC GRAN: (no result)

## 2020-08-29 RX ADMIN — SODIUM BICARBONATE SCH MG: 650 TABLET, ORALLY DISINTEGRATING ORAL at 16:33

## 2020-08-29 RX ADMIN — PANTOPRAZOLE SODIUM SCH MG: 40 TABLET, DELAYED RELEASE ORAL at 16:37

## 2020-08-29 RX ADMIN — AMPICILLIN SODIUM AND SULBACTAM SODIUM SCH MLS/HR: 2; 1 INJECTION, POWDER, FOR SOLUTION INTRAMUSCULAR; INTRAVENOUS at 16:41

## 2020-08-29 RX ADMIN — LEVETIRACETAM SCH MG: 500 TABLET ORAL at 21:06

## 2020-08-29 RX ADMIN — LACTOBACILLUS TAB SCH TAB: TAB at 10:22

## 2020-08-29 RX ADMIN — ALBUMIN (HUMAN) SCH MLS/HR: 25 SOLUTION INTRAVENOUS at 14:32

## 2020-08-29 RX ADMIN — PANTOPRAZOLE SODIUM SCH MG: 40 TABLET, DELAYED RELEASE ORAL at 05:18

## 2020-08-29 RX ADMIN — SODIUM BICARBONATE SCH MG: 650 TABLET, ORALLY DISINTEGRATING ORAL at 21:06

## 2020-08-29 RX ADMIN — ALBUMIN (HUMAN) SCH MLS/HR: 25 SOLUTION INTRAVENOUS at 05:17

## 2020-08-29 RX ADMIN — ALBUMIN (HUMAN) SCH MLS/HR: 25 SOLUTION INTRAVENOUS at 23:08

## 2020-08-29 RX ADMIN — LACTOBACILLUS TAB SCH TAB: TAB at 21:06

## 2020-08-29 RX ADMIN — CALCIUM CARBONATE-CHOLECALCIFEROL TAB 250 MG-125 UNIT SCH TAB: 250-125 TAB at 21:07

## 2020-08-29 RX ADMIN — DOCUSATE SODIUM 50MG AND SENNOSIDES 8.6MG SCH TAB: 8.6; 5 TABLET, FILM COATED ORAL at 10:54

## 2020-08-29 RX ADMIN — MIDODRINE HYDROCHLORIDE SCH MG: 5 TABLET ORAL at 21:07

## 2020-08-29 RX ADMIN — SODIUM BICARBONATE SCH MG: 650 TABLET, ORALLY DISINTEGRATING ORAL at 10:19

## 2020-08-29 RX ADMIN — HEPARIN SODIUM SCH UNITS: 5000 INJECTION, SOLUTION INTRAVENOUS; SUBCUTANEOUS at 16:40

## 2020-08-29 RX ADMIN — LACTOBACILLUS TAB SCH TAB: TAB at 16:33

## 2020-08-29 RX ADMIN — LEVETIRACETAM SCH MG: 500 TABLET ORAL at 10:20

## 2020-08-29 RX ADMIN — MIDODRINE HYDROCHLORIDE SCH MG: 5 TABLET ORAL at 10:23

## 2020-08-29 RX ADMIN — AMPICILLIN SODIUM AND SULBACTAM SODIUM SCH MLS/HR: 2; 1 INJECTION, POWDER, FOR SOLUTION INTRAMUSCULAR; INTRAVENOUS at 23:08

## 2020-08-29 RX ADMIN — MIDODRINE HYDROCHLORIDE SCH MG: 5 TABLET ORAL at 16:35

## 2020-08-29 RX ADMIN — CALCIUM CARBONATE-CHOLECALCIFEROL TAB 250 MG-125 UNIT SCH TAB: 250-125 TAB at 10:25

## 2020-08-30 VITALS — SYSTOLIC BLOOD PRESSURE: 68 MMHG | DIASTOLIC BLOOD PRESSURE: 39 MMHG

## 2020-08-30 VITALS — SYSTOLIC BLOOD PRESSURE: 68 MMHG | DIASTOLIC BLOOD PRESSURE: 47 MMHG

## 2020-08-30 VITALS — DIASTOLIC BLOOD PRESSURE: 43 MMHG | SYSTOLIC BLOOD PRESSURE: 64 MMHG

## 2020-08-30 VITALS — SYSTOLIC BLOOD PRESSURE: 70 MMHG | DIASTOLIC BLOOD PRESSURE: 40 MMHG

## 2020-08-30 VITALS — DIASTOLIC BLOOD PRESSURE: 40 MMHG | SYSTOLIC BLOOD PRESSURE: 75 MMHG

## 2020-08-30 VITALS — SYSTOLIC BLOOD PRESSURE: 72 MMHG | DIASTOLIC BLOOD PRESSURE: 32 MMHG

## 2020-08-30 VITALS — DIASTOLIC BLOOD PRESSURE: 38 MMHG | SYSTOLIC BLOOD PRESSURE: 61 MMHG

## 2020-08-30 LAB
<PLATELET ESTIMATE>: ADEQUATE
<PLT MORPHOLOGY>: (no result)
ACANTHOCYTES BLD QL SMEAR: (no result)
ALBUMIN SERPL-MCNC: 2.7 G/DL (ref 3.4–5)
ALP SERPL-CCNC: 339 U/L (ref 45–117)
ALT SERPL-CCNC: 85 U/L (ref 12–78)
ANION GAP SERPL CALC-SCNC: 11 MMOL/L (ref 5–15)
ANISOCYTOSIS BLD QL SMEAR: (no result)
BILIRUB SERPL-MCNC: 6 MG/DL (ref 0.2–1)
BURR CELLS BLD QL SMEAR: (no result)
CALCIUM SERPL-MCNC: 6.8 MG/DL (ref 8.5–10.1)
CHLORIDE SERPL-SCNC: 101 MMOL/L (ref 98–107)
CREAT SERPL-MCNC: 2.25 MG/DL (ref 0.55–1.02)
ERYTHROCYTE [DISTWIDTH] IN BLOOD BY AUTOMATED COUNT: 19.9 % (ref 9.6–15.2)
INR PPP: 1.47 (ref 0.93–1.1)
LYMPH#(MANUAL): 0.47 X10^3/UL (ref 1–3.4)
LYMPHS% (MANUAL): 6 % (ref 22–44)
MACROCYTES BLD QL SMEAR: (no result)
MCH RBC QN AUTO: 31.1 PG (ref 27–34.8)
MCHC RBC AUTO-ENTMCNC: 33.7 G/DL (ref 32.4–35.8)
MCV RBC AUTO: 92.1 FL (ref 80–100)
MD: YES
MONOS#(MANUAL): 0.47 X10^3/UL (ref 0.3–2.7)
MONOS% (MANUAL): 6 % (ref 2–9)
MYELOCYTES# (MANUAL): 0.08 X10^3/UL (ref 0–0)
MYELOCYTES% (MANUAL): 1 % (ref 0–0)
OVALOCYTES BLD QL SMEAR: (no result)
PLATELET # BLD AUTO: 235 X10^3/UL (ref 130–400)
PMV BLD AUTO: 6.9 FL (ref 7.4–10.4)
POLYCHROMASIA BLD QL SMEAR: (no result)
PROT SERPL-MCNC: 4.6 G/DL (ref 6.4–8.2)
PROTHROMBIN TIME: 15.2 SECONDS (ref 9.6–11.5)
RBC # BLD AUTO: 2.52 X10^6/UL (ref 3.82–5.3)
SEG#(MANUAL): 6.87 X10^3/UL (ref 1.8–6.8)
SEGS% (MANUAL): 87 % (ref 42–75)

## 2020-08-30 RX ADMIN — HEPARIN SODIUM SCH UNITS: 5000 INJECTION, SOLUTION INTRAVENOUS; SUBCUTANEOUS at 16:05

## 2020-08-30 RX ADMIN — PANTOPRAZOLE SODIUM SCH MG: 40 TABLET, DELAYED RELEASE ORAL at 06:26

## 2020-08-30 RX ADMIN — MIDODRINE HYDROCHLORIDE SCH MG: 5 TABLET ORAL at 08:26

## 2020-08-30 RX ADMIN — NOREPINEPHRINE BITARTRATE PRN MLS/HR: 1 INJECTION INTRAVENOUS at 04:05

## 2020-08-30 RX ADMIN — NOREPINEPHRINE BITARTRATE PRN MLS/HR: 1 INJECTION INTRAVENOUS at 09:13

## 2020-08-30 RX ADMIN — AMPICILLIN SODIUM SCH MLS/HR: 1 INJECTION, POWDER, FOR SOLUTION INTRAMUSCULAR; INTRAVENOUS at 11:04

## 2020-08-30 RX ADMIN — AMPICILLIN SODIUM AND SULBACTAM SODIUM SCH MLS/HR: 2; 1 INJECTION, POWDER, FOR SOLUTION INTRAMUSCULAR; INTRAVENOUS at 05:27

## 2020-08-30 RX ADMIN — HEPARIN SODIUM SCH UNITS: 5000 INJECTION, SOLUTION INTRAVENOUS; SUBCUTANEOUS at 08:26

## 2020-08-30 RX ADMIN — ALBUMIN (HUMAN) SCH MLS/HR: 25 SOLUTION INTRAVENOUS at 08:28

## 2020-08-30 RX ADMIN — SODIUM CHLORIDE, PRESERVATIVE FREE SCH ML: 5 INJECTION INTRAVENOUS at 21:54

## 2020-08-30 RX ADMIN — SODIUM BICARBONATE SCH MG: 650 TABLET, ORALLY DISINTEGRATING ORAL at 21:53

## 2020-08-30 RX ADMIN — SODIUM BICARBONATE SCH MG: 650 TABLET, ORALLY DISINTEGRATING ORAL at 08:26

## 2020-08-30 RX ADMIN — SODIUM BICARBONATE SCH MG: 650 TABLET, ORALLY DISINTEGRATING ORAL at 14:36

## 2020-08-30 RX ADMIN — SODIUM CHLORIDE, PRESERVATIVE FREE SCH ML: 5 INJECTION INTRAVENOUS at 08:27

## 2020-08-30 RX ADMIN — LEVETIRACETAM SCH MG: 500 TABLET ORAL at 08:27

## 2020-08-30 RX ADMIN — SODIUM BICARBONATE SCH MG: 650 TABLET, ORALLY DISINTEGRATING ORAL at 08:47

## 2020-08-30 RX ADMIN — LACTOBACILLUS TAB SCH TAB: TAB at 21:53

## 2020-08-30 RX ADMIN — ALBUMIN (HUMAN) ONE MLS/HR: 12.5 SOLUTION INTRAVENOUS at 02:12

## 2020-08-30 RX ADMIN — HEPARIN SODIUM SCH UNITS: 5000 INJECTION, SOLUTION INTRAVENOUS; SUBCUTANEOUS at 00:25

## 2020-08-30 RX ADMIN — CALCIUM CARBONATE-CHOLECALCIFEROL TAB 250 MG-125 UNIT SCH TAB: 250-125 TAB at 21:53

## 2020-08-30 RX ADMIN — LACTOBACILLUS TAB SCH TAB: TAB at 08:27

## 2020-08-30 RX ADMIN — ALBUMIN (HUMAN) ONE MLS/HR: 12.5 SOLUTION INTRAVENOUS at 02:00

## 2020-08-30 RX ADMIN — SODIUM CHLORIDE, SODIUM LACTATE, POTASSIUM CHLORIDE, AND CALCIUM CHLORIDE SCH MLS/HR: .6; .31; .03; .02 INJECTION, SOLUTION INTRAVENOUS at 11:04

## 2020-08-30 RX ADMIN — AMPICILLIN SODIUM SCH MLS/HR: 1 INJECTION, POWDER, FOR SOLUTION INTRAMUSCULAR; INTRAVENOUS at 16:04

## 2020-08-30 RX ADMIN — ERGOCALCIFEROL SCH UNITS: 1.25 CAPSULE, LIQUID FILLED ORAL at 14:36

## 2020-08-30 RX ADMIN — ALBUMIN (HUMAN) SCH MLS/HR: 25 SOLUTION INTRAVENOUS at 17:11

## 2020-08-30 RX ADMIN — LEVETIRACETAM SCH MG: 500 TABLET ORAL at 21:59

## 2020-08-30 RX ADMIN — MIDODRINE HYDROCHLORIDE SCH MG: 5 TABLET ORAL at 14:36

## 2020-08-30 RX ADMIN — PANTOPRAZOLE SODIUM SCH MG: 40 TABLET, DELAYED RELEASE ORAL at 14:39

## 2020-08-30 RX ADMIN — CALCIUM CARBONATE-CHOLECALCIFEROL TAB 250 MG-125 UNIT SCH TAB: 250-125 TAB at 08:27

## 2020-08-30 RX ADMIN — FERROUS SULFATE TAB 325 MG (65 MG ELEMENTAL FE) SCH MG: 325 (65 FE) TAB at 21:59

## 2020-08-30 RX ADMIN — DOCUSATE SODIUM 50MG AND SENNOSIDES 8.6MG SCH TAB: 8.6; 5 TABLET, FILM COATED ORAL at 08:27

## 2020-08-30 RX ADMIN — ONDANSETRON PRN MG: 2 INJECTION, SOLUTION INTRAMUSCULAR; INTRAVENOUS at 05:04

## 2020-08-30 RX ADMIN — MIDODRINE HYDROCHLORIDE SCH MG: 5 TABLET ORAL at 21:53

## 2020-08-30 RX ADMIN — LACTOBACILLUS TAB SCH TAB: TAB at 14:36

## 2020-08-31 LAB
% IRON SATURATION: 30 % (ref 20–55)
<PLATELET ESTIMATE>: ADEQUATE
<PLT MORPHOLOGY>: (no result)
ACANTHOCYTES BLD QL SMEAR: (no result)
ALBUMIN SERPL-MCNC: 2.9 G/DL (ref 3.4–5)
ANION GAP SERPL CALC-SCNC: 9 MMOL/L (ref 5–15)
ANISOCYTOSIS BLD QL SMEAR: (no result)
BAND#(MANUAL): 0.26 X10^3/UL
CALCIUM SERPL-MCNC: 7.5 MG/DL (ref 8.5–10.1)
CHLORIDE SERPL-SCNC: 101 MMOL/L (ref 98–107)
CREAT SERPL-MCNC: 2.66 MG/DL (ref 0.55–1.02)
ERYTHROCYTE [DISTWIDTH] IN BLOOD BY AUTOMATED COUNT: 20.3 % (ref 9.6–15.2)
IRON LEVEL: 37 MCG/DL (ref 50–170)
LYMPH#(MANUAL): 1.2 X10^3/UL (ref 1–3.4)
LYMPHS% (MANUAL): 14 % (ref 22–44)
MACROCYTES BLD QL SMEAR: (no result)
MCH RBC QN AUTO: 31.3 PG (ref 27–34.8)
MCHC RBC AUTO-ENTMCNC: 34.1 G/DL (ref 32.4–35.8)
MCV RBC AUTO: 91.8 FL (ref 80–100)
MD: YES
MONOS#(MANUAL): 0.34 X10^3/UL (ref 0.3–2.7)
MONOS% (MANUAL): 4 % (ref 2–9)
MYELOCYTES# (MANUAL): 0.09 X10^3/UL (ref 0–0)
MYELOCYTES% (MANUAL): 1 % (ref 0–0)
NEUTS BAND NFR BLD: 3 % (ref 0–7)
OVALOCYTES BLD QL SMEAR: (no result)
PLATELET # BLD AUTO: 237 X10^3/UL (ref 130–400)
PMV BLD AUTO: 7.1 FL (ref 7.4–10.4)
POLYCHROMASIA BLD QL SMEAR: (no result)
RBC # BLD AUTO: 2.34 X10^6/UL (ref 3.82–5.3)
SEG#(MANUAL): 6.71 X10^3/UL (ref 1.8–6.8)
SEGS% (MANUAL): 78 % (ref 42–75)
TIBC SERPL-MCNC: 124 MCG/DL (ref 250–450)

## 2020-08-31 PROCEDURE — 02HV33Z INSERTION OF INFUSION DEVICE INTO SUPERIOR VENA CAVA, PERCUTANEOUS APPROACH: ICD-10-PCS | Performed by: RADIOLOGY

## 2020-08-31 PROCEDURE — B548ZZA ULTRASONOGRAPHY OF SUPERIOR VENA CAVA, GUIDANCE: ICD-10-PCS | Performed by: RADIOLOGY

## 2020-08-31 RX ADMIN — LACTOBACILLUS TAB SCH TAB: TAB at 16:03

## 2020-08-31 RX ADMIN — CALCIUM CARBONATE-CHOLECALCIFEROL TAB 250 MG-125 UNIT SCH TAB: 250-125 TAB at 20:59

## 2020-08-31 RX ADMIN — AMPICILLIN SODIUM SCH MLS/HR: 1 INJECTION, POWDER, FOR SOLUTION INTRAMUSCULAR; INTRAVENOUS at 11:31

## 2020-08-31 RX ADMIN — LACTULOSE SCH GM: 10 SOLUTION ORAL at 11:30

## 2020-08-31 RX ADMIN — MIDODRINE HYDROCHLORIDE SCH MG: 5 TABLET ORAL at 16:03

## 2020-08-31 RX ADMIN — LEVETIRACETAM SCH MG: 500 TABLET ORAL at 20:59

## 2020-08-31 RX ADMIN — SODIUM BICARBONATE SCH MG: 650 TABLET, ORALLY DISINTEGRATING ORAL at 16:02

## 2020-08-31 RX ADMIN — LACTULOSE SCH GM: 10 SOLUTION ORAL at 20:59

## 2020-08-31 RX ADMIN — PANTOPRAZOLE SODIUM SCH MG: 40 TABLET, DELAYED RELEASE ORAL at 16:04

## 2020-08-31 RX ADMIN — AMPICILLIN SODIUM SCH MLS/HR: 1 INJECTION, POWDER, FOR SOLUTION INTRAMUSCULAR; INTRAVENOUS at 06:04

## 2020-08-31 RX ADMIN — NOREPINEPHRINE BITARTRATE PRN MLS/HR: 1 INJECTION INTRAVENOUS at 08:54

## 2020-08-31 RX ADMIN — HEPARIN SODIUM SCH UNITS: 5000 INJECTION, SOLUTION INTRAVENOUS; SUBCUTANEOUS at 08:54

## 2020-08-31 RX ADMIN — MIDODRINE HYDROCHLORIDE SCH MG: 5 TABLET ORAL at 08:54

## 2020-08-31 RX ADMIN — HEPARIN SODIUM SCH UNITS: 5000 INJECTION, SOLUTION INTRAVENOUS; SUBCUTANEOUS at 16:04

## 2020-08-31 RX ADMIN — LACTULOSE SCH GM: 10 SOLUTION ORAL at 21:00

## 2020-08-31 RX ADMIN — SODIUM CHLORIDE, PRESERVATIVE FREE SCH ML: 5 INJECTION INTRAVENOUS at 20:59

## 2020-08-31 RX ADMIN — FUROSEMIDE SCH MG: 10 INJECTION, SOLUTION INTRAMUSCULAR; INTRAVENOUS at 20:59

## 2020-08-31 RX ADMIN — SODIUM CHLORIDE, SODIUM LACTATE, POTASSIUM CHLORIDE, AND CALCIUM CHLORIDE SCH MLS/HR: .6; .31; .03; .02 INJECTION, SOLUTION INTRAVENOUS at 00:17

## 2020-08-31 RX ADMIN — PANTOPRAZOLE SODIUM SCH MG: 40 TABLET, DELAYED RELEASE ORAL at 06:04

## 2020-08-31 RX ADMIN — SODIUM BICARBONATE SCH MG: 650 TABLET, ORALLY DISINTEGRATING ORAL at 08:54

## 2020-08-31 RX ADMIN — SODIUM BICARBONATE SCH MG: 650 TABLET, ORALLY DISINTEGRATING ORAL at 21:00

## 2020-08-31 RX ADMIN — AMPICILLIN SODIUM SCH MLS/HR: 1 INJECTION, POWDER, FOR SOLUTION INTRAMUSCULAR; INTRAVENOUS at 17:46

## 2020-08-31 RX ADMIN — LACTOBACILLUS TAB SCH TAB: TAB at 20:59

## 2020-08-31 RX ADMIN — DOCUSATE SODIUM 50MG AND SENNOSIDES 8.6MG SCH TAB: 8.6; 5 TABLET, FILM COATED ORAL at 08:54

## 2020-08-31 RX ADMIN — HEPARIN SODIUM SCH UNITS: 5000 INJECTION, SOLUTION INTRAVENOUS; SUBCUTANEOUS at 00:26

## 2020-08-31 RX ADMIN — SODIUM CHLORIDE, SODIUM LACTATE, POTASSIUM CHLORIDE, AND CALCIUM CHLORIDE SCH MLS/HR: .6; .31; .03; .02 INJECTION, SOLUTION INTRAVENOUS at 16:03

## 2020-08-31 RX ADMIN — FUROSEMIDE SCH MG: 10 INJECTION, SOLUTION INTRAMUSCULAR; INTRAVENOUS at 11:31

## 2020-08-31 RX ADMIN — LEVETIRACETAM SCH MG: 500 TABLET ORAL at 08:54

## 2020-08-31 RX ADMIN — LACTOBACILLUS TAB SCH TAB: TAB at 08:54

## 2020-08-31 RX ADMIN — NOREPINEPHRINE BITARTRATE PRN MLS/HR: 1 INJECTION INTRAVENOUS at 00:15

## 2020-08-31 RX ADMIN — MIDODRINE HYDROCHLORIDE SCH MG: 5 TABLET ORAL at 21:00

## 2020-08-31 RX ADMIN — CALCIUM CARBONATE-CHOLECALCIFEROL TAB 250 MG-125 UNIT SCH TAB: 250-125 TAB at 08:54

## 2020-08-31 RX ADMIN — SODIUM CHLORIDE, PRESERVATIVE FREE SCH ML: 5 INJECTION INTRAVENOUS at 08:55

## 2020-08-31 RX ADMIN — CALCIUM CARBONATE-CHOLECALCIFEROL TAB 250 MG-125 UNIT SCH TAB: 250-125 TAB at 21:00

## 2020-08-31 RX ADMIN — AMPICILLIN SODIUM SCH MLS/HR: 1 INJECTION, POWDER, FOR SOLUTION INTRAMUSCULAR; INTRAVENOUS at 00:15

## 2020-09-01 VITALS — DIASTOLIC BLOOD PRESSURE: 45 MMHG | SYSTOLIC BLOOD PRESSURE: 87 MMHG

## 2020-09-01 VITALS — SYSTOLIC BLOOD PRESSURE: 87 MMHG | DIASTOLIC BLOOD PRESSURE: 45 MMHG

## 2020-09-01 VITALS — SYSTOLIC BLOOD PRESSURE: 89 MMHG | DIASTOLIC BLOOD PRESSURE: 48 MMHG

## 2020-09-01 VITALS — DIASTOLIC BLOOD PRESSURE: 51 MMHG | SYSTOLIC BLOOD PRESSURE: 90 MMHG

## 2020-09-01 VITALS — SYSTOLIC BLOOD PRESSURE: 79 MMHG | DIASTOLIC BLOOD PRESSURE: 43 MMHG

## 2020-09-01 VITALS — DIASTOLIC BLOOD PRESSURE: 44 MMHG | SYSTOLIC BLOOD PRESSURE: 83 MMHG

## 2020-09-01 VITALS — DIASTOLIC BLOOD PRESSURE: 42 MMHG | SYSTOLIC BLOOD PRESSURE: 81 MMHG

## 2020-09-01 LAB
ALBUMIN SERPL-MCNC: 2.6 G/DL (ref 3.4–5)
ALP SERPL-CCNC: 318 U/L (ref 45–117)
ALT SERPL-CCNC: 75 U/L (ref 12–78)
ANION GAP SERPL CALC-SCNC: 10 MMOL/L (ref 5–15)
BASOPHILS # BLD AUTO: 0.01 X10^3/UL (ref 0–0.1)
BASOPHILS NFR BLD AUTO: 0 % (ref 0–1)
BILIRUB SERPL-MCNC: 7.4 MG/DL (ref 0.2–1)
CALCIUM SERPL-MCNC: 8.3 MG/DL (ref 8.5–10.1)
CHLORIDE SERPL-SCNC: 101 MMOL/L (ref 98–107)
CREAT SERPL-MCNC: 2.95 MG/DL (ref 0.55–1.02)
EOSINOPHIL # BLD AUTO: 0.09 X10^3/UL (ref 0–0.4)
EOSINOPHIL NFR BLD AUTO: 1 % (ref 1–7)
ERYTHROCYTE [DISTWIDTH] IN BLOOD BY AUTOMATED COUNT: 20.8 % (ref 9.6–15.2)
LYMPHOCYTES # BLD AUTO: 0.81 X10^3/UL (ref 1–3.4)
LYMPHOCYTES NFR BLD AUTO: 13 % (ref 22–44)
MCH RBC QN AUTO: 31 PG (ref 27–34.8)
MCHC RBC AUTO-ENTMCNC: 33.4 G/DL (ref 32.4–35.8)
MCV RBC AUTO: 92.9 FL (ref 80–100)
MD: (no result)
MONOCYTES # BLD AUTO: 0.53 X10^3/UL (ref 0.2–0.8)
MONOCYTES NFR BLD AUTO: 8 % (ref 2–9)
NEUTROPHILS # BLD AUTO: 4.94 X10^3/UL (ref 1.8–6.8)
NEUTROPHILS NFR BLD AUTO: 77 % (ref 42–75)
PLATELET # BLD AUTO: 183 X10^3/UL (ref 130–400)
PMV BLD AUTO: 6.5 FL (ref 7.4–10.4)
PROT SERPL-MCNC: 4.6 G/DL (ref 6.4–8.2)
RBC # BLD AUTO: 2.23 X10^6/UL (ref 3.82–5.3)

## 2020-09-01 PROCEDURE — 30233N1 TRANSFUSION OF NONAUTOLOGOUS RED BLOOD CELLS INTO PERIPHERAL VEIN, PERCUTANEOUS APPROACH: ICD-10-PCS | Performed by: STUDENT IN AN ORGANIZED HEALTH CARE EDUCATION/TRAINING PROGRAM

## 2020-09-01 RX ADMIN — SODIUM CHLORIDE, PRESERVATIVE FREE SCH ML: 5 INJECTION INTRAVENOUS at 10:23

## 2020-09-01 RX ADMIN — PANTOPRAZOLE SODIUM SCH MG: 40 TABLET, DELAYED RELEASE ORAL at 17:14

## 2020-09-01 RX ADMIN — LACTULOSE SCH GM: 10 SOLUTION ORAL at 20:18

## 2020-09-01 RX ADMIN — FUROSEMIDE SCH MG: 10 INJECTION, SOLUTION INTRAMUSCULAR; INTRAVENOUS at 10:22

## 2020-09-01 RX ADMIN — AMPICILLIN SODIUM SCH MLS/HR: 1 INJECTION, POWDER, FOR SOLUTION INTRAMUSCULAR; INTRAVENOUS at 00:35

## 2020-09-01 RX ADMIN — AMPICILLIN SODIUM SCH MLS/HR: 1 INJECTION, POWDER, FOR SOLUTION INTRAMUSCULAR; INTRAVENOUS at 12:12

## 2020-09-01 RX ADMIN — HEPARIN SODIUM SCH UNITS: 5000 INJECTION, SOLUTION INTRAVENOUS; SUBCUTANEOUS at 00:35

## 2020-09-01 RX ADMIN — SODIUM CHLORIDE, PRESERVATIVE FREE SCH ML: 5 INJECTION INTRAVENOUS at 20:17

## 2020-09-01 RX ADMIN — HEPARIN SODIUM SCH UNITS: 5000 INJECTION, SOLUTION INTRAVENOUS; SUBCUTANEOUS at 10:22

## 2020-09-01 RX ADMIN — LEVETIRACETAM SCH MG: 500 TABLET ORAL at 20:18

## 2020-09-01 RX ADMIN — MIDODRINE HYDROCHLORIDE SCH MG: 5 TABLET ORAL at 17:12

## 2020-09-01 RX ADMIN — CALCIUM CARBONATE-CHOLECALCIFEROL TAB 250 MG-125 UNIT SCH TAB: 250-125 TAB at 10:22

## 2020-09-01 RX ADMIN — AMPICILLIN SODIUM SCH MLS/HR: 1 INJECTION, POWDER, FOR SOLUTION INTRAMUSCULAR; INTRAVENOUS at 06:37

## 2020-09-01 RX ADMIN — AMPICILLIN SODIUM SCH MLS/HR: 1 INJECTION, POWDER, FOR SOLUTION INTRAMUSCULAR; INTRAVENOUS at 18:35

## 2020-09-01 RX ADMIN — CALCIUM CARBONATE-CHOLECALCIFEROL TAB 250 MG-125 UNIT SCH TAB: 250-125 TAB at 20:18

## 2020-09-01 RX ADMIN — LACTOBACILLUS TAB SCH TAB: TAB at 10:22

## 2020-09-01 RX ADMIN — DOCUSATE SODIUM 50MG AND SENNOSIDES 8.6MG SCH TAB: 8.6; 5 TABLET, FILM COATED ORAL at 09:00

## 2020-09-01 RX ADMIN — LACTULOSE SCH GM: 10 SOLUTION ORAL at 10:23

## 2020-09-01 RX ADMIN — PANTOPRAZOLE SODIUM SCH MG: 40 TABLET, DELAYED RELEASE ORAL at 06:37

## 2020-09-01 RX ADMIN — LEVETIRACETAM SCH MG: 500 TABLET ORAL at 10:23

## 2020-09-01 RX ADMIN — ALBUMIN (HUMAN) SCH MLS/HR: 25 SOLUTION INTRAVENOUS at 10:42

## 2020-09-01 RX ADMIN — ALBUMIN (HUMAN) SCH MLS/HR: 25 SOLUTION INTRAVENOUS at 23:01

## 2020-09-01 RX ADMIN — LACTOBACILLUS TAB SCH TAB: TAB at 20:18

## 2020-09-01 RX ADMIN — IRON SUCROSE SCH MG: 20 INJECTION, SOLUTION INTRAVENOUS at 10:22

## 2020-09-01 RX ADMIN — LACTOBACILLUS TAB SCH TAB: TAB at 17:12

## 2020-09-01 RX ADMIN — ONDANSETRON PRN MG: 2 INJECTION, SOLUTION INTRAMUSCULAR; INTRAVENOUS at 10:40

## 2020-09-01 RX ADMIN — ALBUMIN (HUMAN) SCH MLS/HR: 25 SOLUTION INTRAVENOUS at 17:14

## 2020-09-01 RX ADMIN — HEPARIN SODIUM SCH UNITS: 5000 INJECTION, SOLUTION INTRAVENOUS; SUBCUTANEOUS at 17:12

## 2020-09-01 RX ADMIN — FUROSEMIDE SCH MG: 10 INJECTION, SOLUTION INTRAMUSCULAR; INTRAVENOUS at 22:14

## 2020-09-01 RX ADMIN — MIDODRINE HYDROCHLORIDE SCH MG: 5 TABLET ORAL at 10:23

## 2020-09-01 RX ADMIN — MIDODRINE HYDROCHLORIDE SCH MG: 5 TABLET ORAL at 20:18

## 2020-09-02 VITALS — SYSTOLIC BLOOD PRESSURE: 77 MMHG | DIASTOLIC BLOOD PRESSURE: 37 MMHG

## 2020-09-02 LAB
ALBUMIN SERPL-MCNC: 3.5 G/DL (ref 3.4–5)
ALP SERPL-CCNC: 267 U/L (ref 45–117)
ALT SERPL-CCNC: 55 U/L (ref 12–78)
ANION GAP SERPL CALC-SCNC: 11 MMOL/L (ref 5–15)
BASOPHILS # BLD AUTO: 0.01 X10^3/UL (ref 0–0.1)
BASOPHILS NFR BLD AUTO: 0 % (ref 0–1)
BILIRUB SERPL-MCNC: 9.4 MG/DL (ref 0.2–1)
CALCIUM SERPL-MCNC: 8.6 MG/DL (ref 8.5–10.1)
CHLORIDE SERPL-SCNC: 101 MMOL/L (ref 98–107)
CREAT SERPL-MCNC: 3.38 MG/DL (ref 0.55–1.02)
EOSINOPHIL # BLD AUTO: 0.05 X10^3/UL (ref 0–0.4)
EOSINOPHIL NFR BLD AUTO: 1 % (ref 1–7)
ERYTHROCYTE [DISTWIDTH] IN BLOOD BY AUTOMATED COUNT: 19.8 % (ref 9.6–15.2)
LYMPHOCYTES # BLD AUTO: 0.96 X10^3/UL (ref 1–3.4)
LYMPHOCYTES NFR BLD AUTO: 16 % (ref 22–44)
MCH RBC QN AUTO: 31.7 PG (ref 27–34.8)
MCHC RBC AUTO-ENTMCNC: 34.6 G/DL (ref 32.4–35.8)
MCV RBC AUTO: 91.6 FL (ref 80–100)
MD: (no result)
MONOCYTES # BLD AUTO: 0.44 X10^3/UL (ref 0.2–0.8)
MONOCYTES NFR BLD AUTO: 7 % (ref 2–9)
NEUTROPHILS # BLD AUTO: 4.64 X10^3/UL (ref 1.8–6.8)
NEUTROPHILS NFR BLD AUTO: 76 % (ref 42–75)
PLATELET # BLD AUTO: 125 X10^3/UL (ref 130–400)
PMV BLD AUTO: 6.6 FL (ref 7.4–10.4)
PROT SERPL-MCNC: 5.2 G/DL (ref 6.4–8.2)
RBC # BLD AUTO: 2.32 X10^6/UL (ref 3.82–5.3)

## 2020-09-02 RX ADMIN — LACTULOSE SCH GM: 10 SOLUTION ORAL at 21:08

## 2020-09-02 RX ADMIN — DOCUSATE SODIUM 50MG AND SENNOSIDES 8.6MG SCH TAB: 8.6; 5 TABLET, FILM COATED ORAL at 08:52

## 2020-09-02 RX ADMIN — SPIRONOLACTONE SCH MG: 50 TABLET ORAL at 10:29

## 2020-09-02 RX ADMIN — LACTOBACILLUS TAB SCH TAB: TAB at 08:51

## 2020-09-02 RX ADMIN — LACTULOSE SCH GM: 10 SOLUTION ORAL at 08:51

## 2020-09-02 RX ADMIN — SPIRONOLACTONE SCH MG: 50 TABLET ORAL at 23:05

## 2020-09-02 RX ADMIN — AMPICILLIN SODIUM SCH MLS/HR: 1 INJECTION, POWDER, FOR SOLUTION INTRAMUSCULAR; INTRAVENOUS at 00:49

## 2020-09-02 RX ADMIN — AMPICILLIN SODIUM SCH MLS/HR: 1 INJECTION, POWDER, FOR SOLUTION INTRAMUSCULAR; INTRAVENOUS at 17:26

## 2020-09-02 RX ADMIN — ALBUMIN (HUMAN) SCH MLS/HR: 25 SOLUTION INTRAVENOUS at 05:33

## 2020-09-02 RX ADMIN — SODIUM CHLORIDE, PRESERVATIVE FREE SCH ML: 5 INJECTION INTRAVENOUS at 08:52

## 2020-09-02 RX ADMIN — FUROSEMIDE SCH MG: 40 TABLET ORAL at 17:25

## 2020-09-02 RX ADMIN — HEPARIN SODIUM SCH UNITS: 5000 INJECTION, SOLUTION INTRAVENOUS; SUBCUTANEOUS at 09:00

## 2020-09-02 RX ADMIN — IRON SUCROSE SCH MG: 20 INJECTION, SOLUTION INTRAVENOUS at 10:30

## 2020-09-02 RX ADMIN — HEPARIN SODIUM SCH UNITS: 5000 INJECTION, SOLUTION INTRAVENOUS; SUBCUTANEOUS at 01:03

## 2020-09-02 RX ADMIN — PANTOPRAZOLE SODIUM SCH MG: 40 TABLET, DELAYED RELEASE ORAL at 17:42

## 2020-09-02 RX ADMIN — PANTOPRAZOLE SODIUM SCH MG: 40 TABLET, DELAYED RELEASE ORAL at 06:50

## 2020-09-02 RX ADMIN — CALCIUM CARBONATE-CHOLECALCIFEROL TAB 250 MG-125 UNIT SCH TAB: 250-125 TAB at 21:09

## 2020-09-02 RX ADMIN — FUROSEMIDE SCH MG: 10 INJECTION, SOLUTION INTRAMUSCULAR; INTRAVENOUS at 08:51

## 2020-09-02 RX ADMIN — AMPICILLIN SODIUM SCH MLS/HR: 1 INJECTION, POWDER, FOR SOLUTION INTRAMUSCULAR; INTRAVENOUS at 23:32

## 2020-09-02 RX ADMIN — MIDODRINE HYDROCHLORIDE SCH MG: 5 TABLET ORAL at 23:04

## 2020-09-02 RX ADMIN — LACTOBACILLUS TAB SCH TAB: TAB at 17:25

## 2020-09-02 RX ADMIN — LEVETIRACETAM SCH MG: 500 TABLET ORAL at 08:52

## 2020-09-02 RX ADMIN — AMPICILLIN SODIUM SCH MLS/HR: 1 INJECTION, POWDER, FOR SOLUTION INTRAMUSCULAR; INTRAVENOUS at 06:41

## 2020-09-02 RX ADMIN — HEPARIN SODIUM SCH UNITS: 5000 INJECTION, SOLUTION INTRAVENOUS; SUBCUTANEOUS at 21:09

## 2020-09-02 RX ADMIN — LACTOBACILLUS TAB SCH TAB: TAB at 21:08

## 2020-09-02 RX ADMIN — SODIUM CHLORIDE, PRESERVATIVE FREE SCH ML: 5 INJECTION INTRAVENOUS at 21:08

## 2020-09-02 RX ADMIN — CALCIUM CARBONATE-CHOLECALCIFEROL TAB 250 MG-125 UNIT SCH TAB: 250-125 TAB at 08:52

## 2020-09-02 RX ADMIN — MIDODRINE HYDROCHLORIDE SCH MG: 5 TABLET ORAL at 08:51

## 2020-09-02 RX ADMIN — AMPICILLIN SODIUM SCH MLS/HR: 1 INJECTION, POWDER, FOR SOLUTION INTRAMUSCULAR; INTRAVENOUS at 12:07

## 2020-09-02 RX ADMIN — LEVETIRACETAM SCH MG: 500 TABLET ORAL at 21:08

## 2020-09-02 RX ADMIN — MIDODRINE HYDROCHLORIDE SCH MG: 5 TABLET ORAL at 17:26

## 2020-09-03 VITALS — SYSTOLIC BLOOD PRESSURE: 88 MMHG | DIASTOLIC BLOOD PRESSURE: 50 MMHG

## 2020-09-03 LAB
ALBUMIN SERPL-MCNC: 3.4 G/DL (ref 3.4–5)
ALP SERPL-CCNC: 260 U/L (ref 45–117)
ALT SERPL-CCNC: 51 U/L (ref 12–78)
ANION GAP SERPL CALC-SCNC: 13 MMOL/L (ref 5–15)
BASOPHILS # BLD AUTO: 0 X10^3/UL (ref 0–0.1)
BASOPHILS NFR BLD AUTO: 0 % (ref 0–1)
BILIRUB SERPL-MCNC: 10.4 MG/DL (ref 0.2–1)
CALCIUM SERPL-MCNC: 8.8 MG/DL (ref 8.5–10.1)
CHLORIDE SERPL-SCNC: 100 MMOL/L (ref 98–107)
CREAT SERPL-MCNC: 3.8 MG/DL (ref 0.55–1.02)
EOSINOPHIL # BLD AUTO: 0.2 X10^3/UL (ref 0–0.4)
EOSINOPHIL NFR BLD AUTO: 3 % (ref 1–7)
ERYTHROCYTE [DISTWIDTH] IN BLOOD BY AUTOMATED COUNT: 20.4 % (ref 9.6–15.2)
LYMPHOCYTES # BLD AUTO: 0.8 X10^3/UL (ref 1–3.4)
LYMPHOCYTES NFR BLD AUTO: 13 % (ref 22–44)
MCH RBC QN AUTO: 31 PG (ref 27–34.8)
MCHC RBC AUTO-ENTMCNC: 34 G/DL (ref 32.4–35.8)
MCV RBC AUTO: 91 FL (ref 80–100)
MD: NO
MICROSCOPIC: (no result)
MONOCYTES # BLD AUTO: 0.45 X10^3/UL (ref 0.2–0.8)
MONOCYTES NFR BLD AUTO: 7 % (ref 2–9)
NEUTROPHILS # BLD AUTO: 4.96 X10^3/UL (ref 1.8–6.8)
NEUTROPHILS NFR BLD AUTO: 77 % (ref 42–75)
PLATELET # BLD AUTO: 127 X10^3/UL (ref 130–400)
PMV BLD AUTO: 7.5 FL (ref 7.4–10.4)
PROT SERPL-MCNC: 5.2 G/DL (ref 6.4–8.2)
RBC # BLD AUTO: 2.55 X10^6/UL (ref 3.82–5.3)

## 2020-09-03 RX ADMIN — AMPICILLIN SODIUM SCH MLS/HR: 1 INJECTION, POWDER, FOR SOLUTION INTRAMUSCULAR; INTRAVENOUS at 11:18

## 2020-09-03 RX ADMIN — AMPICILLIN SODIUM SCH MLS/HR: 1 INJECTION, POWDER, FOR SOLUTION INTRAMUSCULAR; INTRAVENOUS at 23:47

## 2020-09-03 RX ADMIN — FUROSEMIDE SCH MG: 40 TABLET ORAL at 17:23

## 2020-09-03 RX ADMIN — MIDODRINE HYDROCHLORIDE SCH MG: 5 TABLET ORAL at 09:06

## 2020-09-03 RX ADMIN — CALCIUM CARBONATE-CHOLECALCIFEROL TAB 250 MG-125 UNIT SCH TAB: 250-125 TAB at 20:23

## 2020-09-03 RX ADMIN — LACTULOSE SCH GM: 10 SOLUTION ORAL at 20:23

## 2020-09-03 RX ADMIN — LACTULOSE SCH GM: 10 SOLUTION ORAL at 09:11

## 2020-09-03 RX ADMIN — HEPARIN SODIUM SCH UNITS: 5000 INJECTION, SOLUTION INTRAVENOUS; SUBCUTANEOUS at 20:24

## 2020-09-03 RX ADMIN — SODIUM CHLORIDE, PRESERVATIVE FREE SCH ML: 5 INJECTION INTRAVENOUS at 20:24

## 2020-09-03 RX ADMIN — CALCIUM CARBONATE-CHOLECALCIFEROL TAB 250 MG-125 UNIT SCH TAB: 250-125 TAB at 09:08

## 2020-09-03 RX ADMIN — SODIUM CHLORIDE, PRESERVATIVE FREE SCH ML: 5 INJECTION INTRAVENOUS at 09:11

## 2020-09-03 RX ADMIN — AMPICILLIN SODIUM SCH MLS/HR: 1 INJECTION, POWDER, FOR SOLUTION INTRAMUSCULAR; INTRAVENOUS at 17:24

## 2020-09-03 RX ADMIN — LEVETIRACETAM SCH MG: 500 TABLET ORAL at 20:24

## 2020-09-03 RX ADMIN — LACTOBACILLUS TAB SCH TAB: TAB at 09:06

## 2020-09-03 RX ADMIN — SPIRONOLACTONE SCH MG: 50 TABLET ORAL at 09:04

## 2020-09-03 RX ADMIN — PANTOPRAZOLE SODIUM SCH MG: 40 TABLET, DELAYED RELEASE ORAL at 05:23

## 2020-09-03 RX ADMIN — LACTOBACILLUS TAB SCH TAB: TAB at 16:18

## 2020-09-03 RX ADMIN — MIDODRINE HYDROCHLORIDE SCH MG: 5 TABLET ORAL at 20:24

## 2020-09-03 RX ADMIN — MIDODRINE HYDROCHLORIDE SCH MG: 5 TABLET ORAL at 16:17

## 2020-09-03 RX ADMIN — SPIRONOLACTONE SCH MG: 50 TABLET ORAL at 20:23

## 2020-09-03 RX ADMIN — PANTOPRAZOLE SODIUM SCH MG: 40 TABLET, DELAYED RELEASE ORAL at 16:19

## 2020-09-03 RX ADMIN — HEPARIN SODIUM SCH UNITS: 5000 INJECTION, SOLUTION INTRAVENOUS; SUBCUTANEOUS at 09:12

## 2020-09-03 RX ADMIN — AMPICILLIN SODIUM SCH MLS/HR: 1 INJECTION, POWDER, FOR SOLUTION INTRAMUSCULAR; INTRAVENOUS at 05:23

## 2020-09-03 RX ADMIN — DOCUSATE SODIUM 50MG AND SENNOSIDES 8.6MG SCH TAB: 8.6; 5 TABLET, FILM COATED ORAL at 09:09

## 2020-09-03 RX ADMIN — FUROSEMIDE SCH MG: 40 TABLET ORAL at 09:03

## 2020-09-03 RX ADMIN — IRON SUCROSE SCH MG: 20 INJECTION, SOLUTION INTRAVENOUS at 11:16

## 2020-09-03 RX ADMIN — LACTOBACILLUS TAB SCH TAB: TAB at 20:23

## 2020-09-03 RX ADMIN — LEVETIRACETAM SCH MG: 500 TABLET ORAL at 09:05

## 2020-09-04 VITALS — DIASTOLIC BLOOD PRESSURE: 52 MMHG | SYSTOLIC BLOOD PRESSURE: 83 MMHG

## 2020-09-04 LAB
ALBUMIN SERPL-MCNC: 3.2 G/DL (ref 3.4–5)
ALP SERPL-CCNC: 249 U/L (ref 45–117)
ALT SERPL-CCNC: 49 U/L (ref 12–78)
ANION GAP SERPL CALC-SCNC: 12 MMOL/L (ref 5–15)
BASOPHILS # BLD AUTO: 0 X10^3/UL (ref 0–0.1)
BASOPHILS NFR BLD AUTO: 0 % (ref 0–1)
BILIRUB SERPL-MCNC: 10 MG/DL (ref 0.2–1)
CALCIUM SERPL-MCNC: 9.6 MG/DL (ref 8.5–10.1)
CHLORIDE SERPL-SCNC: 100 MMOL/L (ref 98–107)
CREAT SERPL-MCNC: 4 MG/DL (ref 0.55–1.02)
EOSINOPHIL # BLD AUTO: 0.13 X10^3/UL (ref 0–0.4)
EOSINOPHIL NFR BLD AUTO: 2 % (ref 1–7)
ERYTHROCYTE [DISTWIDTH] IN BLOOD BY AUTOMATED COUNT: 20.1 % (ref 9.6–15.2)
LYMPHOCYTES # BLD AUTO: 0.94 X10^3/UL (ref 1–3.4)
LYMPHOCYTES NFR BLD AUTO: 14 % (ref 22–44)
MCH RBC QN AUTO: 31.1 PG (ref 27–34.8)
MCHC RBC AUTO-ENTMCNC: 33.9 G/DL (ref 32.4–35.8)
MCV RBC AUTO: 91.7 FL (ref 80–100)
MD: NO
MONOCYTES # BLD AUTO: 0.57 X10^3/UL (ref 0.2–0.8)
MONOCYTES NFR BLD AUTO: 8 % (ref 2–9)
NEUTROPHILS # BLD AUTO: 5.33 X10^3/UL (ref 1.8–6.8)
NEUTROPHILS NFR BLD AUTO: 76 % (ref 42–75)
PLATELET # BLD AUTO: 141 X10^3/UL (ref 130–400)
PMV BLD AUTO: 7.3 FL (ref 7.4–10.4)
PROT SERPL-MCNC: 5.1 G/DL (ref 6.4–8.2)
RBC # BLD AUTO: 2.67 X10^6/UL (ref 3.82–5.3)

## 2020-09-04 RX ADMIN — IRON SUCROSE SCH MG: 20 INJECTION, SOLUTION INTRAVENOUS at 14:24

## 2020-09-04 RX ADMIN — MIDODRINE HYDROCHLORIDE SCH MG: 5 TABLET ORAL at 21:43

## 2020-09-04 RX ADMIN — CALCIUM CARBONATE-CHOLECALCIFEROL TAB 250 MG-125 UNIT SCH TAB: 250-125 TAB at 21:43

## 2020-09-04 RX ADMIN — AMPICILLIN SODIUM SCH MLS/HR: 1 INJECTION, POWDER, FOR SOLUTION INTRAMUSCULAR; INTRAVENOUS at 11:41

## 2020-09-04 RX ADMIN — FUROSEMIDE SCH MG: 40 TABLET ORAL at 08:41

## 2020-09-04 RX ADMIN — LACTULOSE SCH GM: 10 SOLUTION ORAL at 08:39

## 2020-09-04 RX ADMIN — LACTULOSE SCH GM: 10 SOLUTION ORAL at 21:43

## 2020-09-04 RX ADMIN — FUROSEMIDE SCH MG: 40 TABLET ORAL at 16:47

## 2020-09-04 RX ADMIN — SODIUM CHLORIDE, PRESERVATIVE FREE SCH ML: 5 INJECTION INTRAVENOUS at 08:42

## 2020-09-04 RX ADMIN — CALCIUM CARBONATE-CHOLECALCIFEROL TAB 250 MG-125 UNIT SCH TAB: 250-125 TAB at 08:47

## 2020-09-04 RX ADMIN — MIDODRINE HYDROCHLORIDE SCH MG: 5 TABLET ORAL at 16:47

## 2020-09-04 RX ADMIN — SODIUM CHLORIDE, PRESERVATIVE FREE SCH ML: 5 INJECTION INTRAVENOUS at 21:44

## 2020-09-04 RX ADMIN — PANTOPRAZOLE SODIUM SCH MG: 40 TABLET, DELAYED RELEASE ORAL at 06:29

## 2020-09-04 RX ADMIN — MIDODRINE HYDROCHLORIDE SCH MG: 5 TABLET ORAL at 08:41

## 2020-09-04 RX ADMIN — SPIRONOLACTONE SCH MG: 50 TABLET ORAL at 21:42

## 2020-09-04 RX ADMIN — LEVETIRACETAM SCH MG: 500 TABLET ORAL at 08:41

## 2020-09-04 RX ADMIN — LEVETIRACETAM SCH MG: 500 TABLET ORAL at 21:44

## 2020-09-04 RX ADMIN — HEPARIN SODIUM SCH UNITS: 5000 INJECTION, SOLUTION INTRAVENOUS; SUBCUTANEOUS at 09:02

## 2020-09-04 RX ADMIN — AMPICILLIN SODIUM SCH MLS/HR: 1 INJECTION, POWDER, FOR SOLUTION INTRAMUSCULAR; INTRAVENOUS at 05:42

## 2020-09-04 RX ADMIN — SPIRONOLACTONE SCH MG: 50 TABLET ORAL at 08:41

## 2020-09-04 RX ADMIN — HEPARIN SODIUM SCH UNITS: 5000 INJECTION, SOLUTION INTRAVENOUS; SUBCUTANEOUS at 21:43

## 2020-09-04 RX ADMIN — AMPICILLIN SODIUM SCH MLS/HR: 1 INJECTION, POWDER, FOR SOLUTION INTRAMUSCULAR; INTRAVENOUS at 23:24

## 2020-09-04 RX ADMIN — LACTOBACILLUS TAB SCH TAB: TAB at 16:47

## 2020-09-04 RX ADMIN — PANTOPRAZOLE SODIUM SCH MG: 40 TABLET, DELAYED RELEASE ORAL at 16:54

## 2020-09-04 RX ADMIN — LACTOBACILLUS TAB SCH TAB: TAB at 08:41

## 2020-09-04 RX ADMIN — LACTOBACILLUS TAB SCH TAB: TAB at 21:45

## 2020-09-04 RX ADMIN — DOCUSATE SODIUM 50MG AND SENNOSIDES 8.6MG SCH TAB: 8.6; 5 TABLET, FILM COATED ORAL at 08:48

## 2020-09-04 RX ADMIN — AMPICILLIN SODIUM SCH MLS/HR: 1 INJECTION, POWDER, FOR SOLUTION INTRAMUSCULAR; INTRAVENOUS at 16:54

## 2020-09-04 RX ADMIN — SODIUM CHLORIDE SCH MLS/HR: 0.9 INJECTION, SOLUTION INTRAVENOUS at 10:30

## 2020-09-05 VITALS — SYSTOLIC BLOOD PRESSURE: 94 MMHG | DIASTOLIC BLOOD PRESSURE: 55 MMHG

## 2020-09-05 VITALS — SYSTOLIC BLOOD PRESSURE: 93 MMHG | DIASTOLIC BLOOD PRESSURE: 61 MMHG

## 2020-09-05 VITALS — SYSTOLIC BLOOD PRESSURE: 76 MMHG | DIASTOLIC BLOOD PRESSURE: 45 MMHG

## 2020-09-05 VITALS — SYSTOLIC BLOOD PRESSURE: 95 MMHG | DIASTOLIC BLOOD PRESSURE: 62 MMHG

## 2020-09-05 LAB
ALBUMIN SERPL-MCNC: 3.1 G/DL (ref 3.4–5)
ALP SERPL-CCNC: 234 U/L (ref 45–117)
ALT SERPL-CCNC: 49 U/L (ref 12–78)
ANION GAP SERPL CALC-SCNC: 14 MMOL/L (ref 5–15)
BASOPHILS # BLD AUTO: 0.02 X10^3/UL (ref 0–0.1)
BASOPHILS NFR BLD AUTO: 0 % (ref 0–1)
BILIRUB SERPL-MCNC: 9.2 MG/DL (ref 0.2–1)
CALCIUM SERPL-MCNC: 9.2 MG/DL (ref 8.5–10.1)
CHLORIDE SERPL-SCNC: 102 MMOL/L (ref 98–107)
CHLORIDE,URINE RANDOM: 35 MMOL/L
CREAT SERPL-MCNC: 4.07 MG/DL (ref 0.55–1.02)
EOSINOPHIL # BLD AUTO: 0.06 X10^3/UL (ref 0–0.4)
EOSINOPHIL NFR BLD AUTO: 1 % (ref 1–7)
ERYTHROCYTE [DISTWIDTH] IN BLOOD BY AUTOMATED COUNT: 20.1 % (ref 9.6–15.2)
LYMPHOCYTES # BLD AUTO: 1.31 X10^3/UL (ref 1–3.4)
LYMPHOCYTES NFR BLD AUTO: 16 % (ref 22–44)
MCH RBC QN AUTO: 31 PG (ref 27–34.8)
MCHC RBC AUTO-ENTMCNC: 33.3 G/DL (ref 32.4–35.8)
MCV RBC AUTO: 93.1 FL (ref 80–100)
MD: NO
MICROSCOPIC: (no result)
MONOCYTES # BLD AUTO: 0.55 X10^3/UL (ref 0.2–0.8)
MONOCYTES NFR BLD AUTO: 7 % (ref 2–9)
NEUTROPHILS # BLD AUTO: 6.11 X10^3/UL (ref 1.8–6.8)
NEUTROPHILS NFR BLD AUTO: 76 % (ref 42–75)
PLATELET # BLD AUTO: 134 X10^3/UL (ref 130–400)
PMV BLD AUTO: 6.6 FL (ref 7.4–10.4)
POTASSIUM UR-SCNC: 27 MMOL/L
PROT SERPL-MCNC: 4.9 G/DL (ref 6.4–8.2)
RBC # BLD AUTO: 2.66 X10^6/UL (ref 3.82–5.3)
SODIUM,URINE RANDOM: 14 MMOL/L

## 2020-09-05 RX ADMIN — LACTOBACILLUS TAB SCH TAB: TAB at 16:54

## 2020-09-05 RX ADMIN — HEPARIN SODIUM SCH UNITS: 5000 INJECTION, SOLUTION INTRAVENOUS; SUBCUTANEOUS at 08:39

## 2020-09-05 RX ADMIN — FUROSEMIDE SCH MG: 40 TABLET ORAL at 09:57

## 2020-09-05 RX ADMIN — MIDODRINE HYDROCHLORIDE SCH MG: 5 TABLET ORAL at 21:16

## 2020-09-05 RX ADMIN — PANTOPRAZOLE SODIUM SCH MG: 40 TABLET, DELAYED RELEASE ORAL at 08:41

## 2020-09-05 RX ADMIN — SODIUM CHLORIDE SCH MLS/HR: 0.9 INJECTION, SOLUTION INTRAVENOUS at 02:06

## 2020-09-05 RX ADMIN — LEVETIRACETAM SCH MG: 500 TABLET ORAL at 08:40

## 2020-09-05 RX ADMIN — AMPICILLIN SODIUM SCH MLS/HR: 1 INJECTION, POWDER, FOR SOLUTION INTRAMUSCULAR; INTRAVENOUS at 17:28

## 2020-09-05 RX ADMIN — MIDODRINE HYDROCHLORIDE SCH MG: 5 TABLET ORAL at 08:40

## 2020-09-05 RX ADMIN — FUROSEMIDE SCH MG: 40 TABLET ORAL at 16:54

## 2020-09-05 RX ADMIN — HEPARIN SODIUM SCH UNITS: 5000 INJECTION, SOLUTION INTRAVENOUS; SUBCUTANEOUS at 21:16

## 2020-09-05 RX ADMIN — CALCIUM CARBONATE-CHOLECALCIFEROL TAB 250 MG-125 UNIT SCH TAB: 250-125 TAB at 21:16

## 2020-09-05 RX ADMIN — DOCUSATE SODIUM 50MG AND SENNOSIDES 8.6MG SCH TAB: 8.6; 5 TABLET, FILM COATED ORAL at 08:40

## 2020-09-05 RX ADMIN — LACTULOSE SCH GM: 10 SOLUTION ORAL at 21:16

## 2020-09-05 RX ADMIN — CALCIUM CARBONATE-CHOLECALCIFEROL TAB 250 MG-125 UNIT SCH TAB: 250-125 TAB at 08:39

## 2020-09-05 RX ADMIN — AMPICILLIN SODIUM SCH MLS/HR: 1 INJECTION, POWDER, FOR SOLUTION INTRAMUSCULAR; INTRAVENOUS at 12:06

## 2020-09-05 RX ADMIN — IRON SUCROSE SCH MG: 20 INJECTION, SOLUTION INTRAVENOUS at 11:04

## 2020-09-05 RX ADMIN — LACTOBACILLUS TAB SCH TAB: TAB at 21:16

## 2020-09-05 RX ADMIN — SPIRONOLACTONE SCH MG: 50 TABLET ORAL at 08:42

## 2020-09-05 RX ADMIN — LEVETIRACETAM SCH MG: 500 TABLET ORAL at 21:16

## 2020-09-05 RX ADMIN — SODIUM CHLORIDE, PRESERVATIVE FREE SCH ML: 5 INJECTION INTRAVENOUS at 21:17

## 2020-09-05 RX ADMIN — AMPICILLIN SODIUM SCH MLS/HR: 1 INJECTION, POWDER, FOR SOLUTION INTRAMUSCULAR; INTRAVENOUS at 23:41

## 2020-09-05 RX ADMIN — SODIUM CHLORIDE SCH MLS/HR: 0.9 INJECTION, SOLUTION INTRAVENOUS at 10:30

## 2020-09-05 RX ADMIN — LACTOBACILLUS TAB SCH TAB: TAB at 08:39

## 2020-09-05 RX ADMIN — LACTULOSE SCH GM: 10 SOLUTION ORAL at 08:39

## 2020-09-05 RX ADMIN — SPIRONOLACTONE SCH MG: 50 TABLET ORAL at 09:57

## 2020-09-05 RX ADMIN — PANTOPRAZOLE SODIUM SCH MG: 40 TABLET, DELAYED RELEASE ORAL at 16:54

## 2020-09-05 RX ADMIN — MIDODRINE HYDROCHLORIDE SCH MG: 5 TABLET ORAL at 16:54

## 2020-09-05 RX ADMIN — AMPICILLIN SODIUM SCH MLS/HR: 1 INJECTION, POWDER, FOR SOLUTION INTRAMUSCULAR; INTRAVENOUS at 06:05

## 2020-09-05 RX ADMIN — FUROSEMIDE SCH MG: 40 TABLET ORAL at 08:00

## 2020-09-05 RX ADMIN — SPIRONOLACTONE SCH MG: 50 TABLET ORAL at 21:00

## 2020-09-05 RX ADMIN — SODIUM CHLORIDE, PRESERVATIVE FREE SCH ML: 5 INJECTION INTRAVENOUS at 08:40

## 2020-09-06 VITALS — DIASTOLIC BLOOD PRESSURE: 61 MMHG | SYSTOLIC BLOOD PRESSURE: 95 MMHG

## 2020-09-06 VITALS — SYSTOLIC BLOOD PRESSURE: 95 MMHG | DIASTOLIC BLOOD PRESSURE: 60 MMHG

## 2020-09-06 VITALS — DIASTOLIC BLOOD PRESSURE: 53 MMHG | SYSTOLIC BLOOD PRESSURE: 91 MMHG

## 2020-09-06 VITALS — DIASTOLIC BLOOD PRESSURE: 54 MMHG | SYSTOLIC BLOOD PRESSURE: 92 MMHG

## 2020-09-06 RX ADMIN — LEVETIRACETAM SCH MG: 500 TABLET ORAL at 21:20

## 2020-09-06 RX ADMIN — HEPARIN SODIUM SCH UNITS: 5000 INJECTION, SOLUTION INTRAVENOUS; SUBCUTANEOUS at 21:20

## 2020-09-06 RX ADMIN — CALCIUM CARBONATE-CHOLECALCIFEROL TAB 250 MG-125 UNIT SCH TAB: 250-125 TAB at 21:20

## 2020-09-06 RX ADMIN — SODIUM CHLORIDE, PRESERVATIVE FREE SCH ML: 5 INJECTION INTRAVENOUS at 21:19

## 2020-09-06 RX ADMIN — LACTOBACILLUS TAB SCH TAB: TAB at 08:50

## 2020-09-06 RX ADMIN — ERGOCALCIFEROL SCH UNITS: 1.25 CAPSULE, LIQUID FILLED ORAL at 16:06

## 2020-09-06 RX ADMIN — FUROSEMIDE SCH MG: 40 TABLET ORAL at 08:00

## 2020-09-06 RX ADMIN — PANTOPRAZOLE SODIUM SCH MG: 40 TABLET, DELAYED RELEASE ORAL at 05:29

## 2020-09-06 RX ADMIN — FUROSEMIDE SCH MG: 40 TABLET ORAL at 15:56

## 2020-09-06 RX ADMIN — AMPICILLIN SODIUM SCH MLS/HR: 1 INJECTION, POWDER, FOR SOLUTION INTRAMUSCULAR; INTRAVENOUS at 23:37

## 2020-09-06 RX ADMIN — SPIRONOLACTONE SCH MG: 50 TABLET ORAL at 21:20

## 2020-09-06 RX ADMIN — HEPARIN SODIUM SCH UNITS: 5000 INJECTION, SOLUTION INTRAVENOUS; SUBCUTANEOUS at 08:50

## 2020-09-06 RX ADMIN — LACTULOSE SCH GM: 10 SOLUTION ORAL at 08:49

## 2020-09-06 RX ADMIN — LACTULOSE SCH GM: 10 SOLUTION ORAL at 21:19

## 2020-09-06 RX ADMIN — MIDODRINE HYDROCHLORIDE SCH MG: 5 TABLET ORAL at 08:50

## 2020-09-06 RX ADMIN — SODIUM CHLORIDE, PRESERVATIVE FREE SCH ML: 5 INJECTION INTRAVENOUS at 08:50

## 2020-09-06 RX ADMIN — LACTOBACILLUS TAB SCH TAB: TAB at 21:19

## 2020-09-06 RX ADMIN — CALCIUM CARBONATE-CHOLECALCIFEROL TAB 250 MG-125 UNIT SCH TAB: 250-125 TAB at 08:50

## 2020-09-06 RX ADMIN — MIDODRINE HYDROCHLORIDE SCH MG: 5 TABLET ORAL at 16:07

## 2020-09-06 RX ADMIN — SODIUM CHLORIDE SCH MLS/HR: 0.9 INJECTION, SOLUTION INTRAVENOUS at 05:32

## 2020-09-06 RX ADMIN — AMPICILLIN SODIUM SCH MLS/HR: 1 INJECTION, POWDER, FOR SOLUTION INTRAMUSCULAR; INTRAVENOUS at 05:29

## 2020-09-06 RX ADMIN — AMPICILLIN SODIUM SCH MLS/HR: 1 INJECTION, POWDER, FOR SOLUTION INTRAMUSCULAR; INTRAVENOUS at 17:50

## 2020-09-06 RX ADMIN — LACTOBACILLUS TAB SCH TAB: TAB at 16:06

## 2020-09-06 RX ADMIN — MIDODRINE HYDROCHLORIDE SCH MG: 5 TABLET ORAL at 21:20

## 2020-09-06 RX ADMIN — SPIRONOLACTONE SCH MG: 50 TABLET ORAL at 08:45

## 2020-09-06 RX ADMIN — PANTOPRAZOLE SODIUM SCH MG: 40 TABLET, DELAYED RELEASE ORAL at 16:06

## 2020-09-06 RX ADMIN — AMPICILLIN SODIUM SCH MLS/HR: 1 INJECTION, POWDER, FOR SOLUTION INTRAMUSCULAR; INTRAVENOUS at 12:27

## 2020-09-06 RX ADMIN — LEVETIRACETAM SCH MG: 500 TABLET ORAL at 08:50

## 2020-09-06 RX ADMIN — DOCUSATE SODIUM 50MG AND SENNOSIDES 8.6MG SCH TAB: 8.6; 5 TABLET, FILM COATED ORAL at 08:49

## 2020-09-07 VITALS — SYSTOLIC BLOOD PRESSURE: 95 MMHG | DIASTOLIC BLOOD PRESSURE: 3 MMHG

## 2020-09-07 VITALS — DIASTOLIC BLOOD PRESSURE: 65 MMHG | SYSTOLIC BLOOD PRESSURE: 99 MMHG

## 2020-09-07 VITALS — DIASTOLIC BLOOD PRESSURE: 66 MMHG | SYSTOLIC BLOOD PRESSURE: 96 MMHG

## 2020-09-07 VITALS — DIASTOLIC BLOOD PRESSURE: 58 MMHG | SYSTOLIC BLOOD PRESSURE: 93 MMHG

## 2020-09-07 RX ADMIN — AMPICILLIN SODIUM SCH MLS/HR: 1 INJECTION, POWDER, FOR SOLUTION INTRAMUSCULAR; INTRAVENOUS at 23:22

## 2020-09-07 RX ADMIN — LACTULOSE SCH GM: 10 SOLUTION ORAL at 21:00

## 2020-09-07 RX ADMIN — DOCUSATE SODIUM 50MG AND SENNOSIDES 8.6MG SCH TAB: 8.6; 5 TABLET, FILM COATED ORAL at 08:30

## 2020-09-07 RX ADMIN — HEPARIN SODIUM SCH UNITS: 5000 INJECTION, SOLUTION INTRAVENOUS; SUBCUTANEOUS at 08:30

## 2020-09-07 RX ADMIN — SODIUM CHLORIDE SCH MLS/HR: 0.9 INJECTION, SOLUTION INTRAVENOUS at 02:43

## 2020-09-07 RX ADMIN — CALCIUM CARBONATE-CHOLECALCIFEROL TAB 250 MG-125 UNIT SCH TAB: 250-125 TAB at 08:30

## 2020-09-07 RX ADMIN — LACTOBACILLUS TAB SCH TAB: TAB at 08:30

## 2020-09-07 RX ADMIN — MIDODRINE HYDROCHLORIDE SCH MG: 5 TABLET ORAL at 16:44

## 2020-09-07 RX ADMIN — HEPARIN SODIUM SCH UNITS: 5000 INJECTION, SOLUTION INTRAVENOUS; SUBCUTANEOUS at 21:00

## 2020-09-07 RX ADMIN — SODIUM CHLORIDE, PRESERVATIVE FREE SCH ML: 5 INJECTION INTRAVENOUS at 21:15

## 2020-09-07 RX ADMIN — LACTULOSE SCH GM: 10 SOLUTION ORAL at 08:30

## 2020-09-07 RX ADMIN — CALCIUM CARBONATE-CHOLECALCIFEROL TAB 250 MG-125 UNIT SCH TAB: 250-125 TAB at 21:00

## 2020-09-07 RX ADMIN — AMPICILLIN SODIUM SCH MLS/HR: 1 INJECTION, POWDER, FOR SOLUTION INTRAMUSCULAR; INTRAVENOUS at 05:55

## 2020-09-07 RX ADMIN — MIDODRINE HYDROCHLORIDE SCH MG: 5 TABLET ORAL at 08:30

## 2020-09-07 RX ADMIN — FUROSEMIDE SCH MG: 40 TABLET ORAL at 08:30

## 2020-09-07 RX ADMIN — PANTOPRAZOLE SODIUM SCH MG: 40 TABLET, DELAYED RELEASE ORAL at 06:39

## 2020-09-07 RX ADMIN — LACTOBACILLUS TAB SCH TAB: TAB at 16:44

## 2020-09-07 RX ADMIN — SPIRONOLACTONE SCH MG: 50 TABLET ORAL at 21:00

## 2020-09-07 RX ADMIN — PANTOPRAZOLE SODIUM SCH MG: 40 TABLET, DELAYED RELEASE ORAL at 16:44

## 2020-09-07 RX ADMIN — AMPICILLIN SODIUM SCH MLS/HR: 1 INJECTION, POWDER, FOR SOLUTION INTRAMUSCULAR; INTRAVENOUS at 12:52

## 2020-09-07 RX ADMIN — SODIUM CHLORIDE, PRESERVATIVE FREE SCH ML: 5 INJECTION INTRAVENOUS at 12:52

## 2020-09-07 RX ADMIN — LEVETIRACETAM SCH MG: 500 TABLET ORAL at 21:00

## 2020-09-07 RX ADMIN — FUROSEMIDE SCH MG: 40 TABLET ORAL at 16:45

## 2020-09-07 RX ADMIN — MIDODRINE HYDROCHLORIDE SCH MG: 5 TABLET ORAL at 21:00

## 2020-09-07 RX ADMIN — SODIUM CHLORIDE SCH MLS/HR: 0.9 INJECTION, SOLUTION INTRAVENOUS at 23:31

## 2020-09-07 RX ADMIN — LACTOBACILLUS TAB SCH TAB: TAB at 21:00

## 2020-09-07 RX ADMIN — SPIRONOLACTONE SCH MG: 50 TABLET ORAL at 08:30

## 2020-09-07 RX ADMIN — LEVETIRACETAM SCH MG: 500 TABLET ORAL at 08:30

## 2020-09-07 RX ADMIN — AMPICILLIN SODIUM SCH MLS/HR: 1 INJECTION, POWDER, FOR SOLUTION INTRAMUSCULAR; INTRAVENOUS at 18:16

## 2020-09-08 VITALS — DIASTOLIC BLOOD PRESSURE: 64 MMHG | SYSTOLIC BLOOD PRESSURE: 98 MMHG

## 2020-09-08 VITALS — SYSTOLIC BLOOD PRESSURE: 114 MMHG | DIASTOLIC BLOOD PRESSURE: 70 MMHG

## 2020-09-08 VITALS — DIASTOLIC BLOOD PRESSURE: 59 MMHG | SYSTOLIC BLOOD PRESSURE: 94 MMHG

## 2020-09-08 RX ADMIN — AMPICILLIN SODIUM SCH MLS/HR: 1 INJECTION, POWDER, FOR SOLUTION INTRAMUSCULAR; INTRAVENOUS at 05:57

## 2020-09-08 RX ADMIN — LEVETIRACETAM SCH MG: 500 TABLET ORAL at 07:43

## 2020-09-08 RX ADMIN — PANTOPRAZOLE SODIUM SCH MG: 40 TABLET, DELAYED RELEASE ORAL at 05:57

## 2020-09-08 RX ADMIN — CALCIUM CARBONATE-CHOLECALCIFEROL TAB 250 MG-125 UNIT SCH TAB: 250-125 TAB at 07:43

## 2020-09-08 RX ADMIN — HEPARIN SODIUM SCH UNITS: 5000 INJECTION, SOLUTION INTRAVENOUS; SUBCUTANEOUS at 07:42

## 2020-09-08 RX ADMIN — SODIUM CHLORIDE, PRESERVATIVE FREE SCH ML: 5 INJECTION INTRAVENOUS at 07:41

## 2020-09-08 RX ADMIN — LACTOBACILLUS TAB SCH TAB: TAB at 07:43

## 2020-09-08 RX ADMIN — SPIRONOLACTONE SCH MG: 50 TABLET ORAL at 07:42

## 2020-09-08 RX ADMIN — AMPICILLIN SODIUM SCH MLS/HR: 1 INJECTION, POWDER, FOR SOLUTION INTRAMUSCULAR; INTRAVENOUS at 11:58

## 2020-09-08 RX ADMIN — FUROSEMIDE SCH MG: 40 TABLET ORAL at 07:41

## 2020-09-08 RX ADMIN — DOCUSATE SODIUM 50MG AND SENNOSIDES 8.6MG SCH TAB: 8.6; 5 TABLET, FILM COATED ORAL at 07:42

## 2020-09-08 RX ADMIN — MIDODRINE HYDROCHLORIDE SCH MG: 5 TABLET ORAL at 07:43

## 2020-09-08 RX ADMIN — LACTULOSE SCH GM: 10 SOLUTION ORAL at 07:42

## 2020-09-10 ENCOUNTER — HOSPITAL ENCOUNTER (INPATIENT)
Dept: HOSPITAL 8 - ED | Age: 60
LOS: 4 days | DRG: 469 | End: 2020-09-14
Attending: HOSPITALIST | Admitting: HOSPITALIST
Payer: MEDICAID

## 2020-09-10 VITALS — WEIGHT: 154.98 LBS | BODY MASS INDEX: 24.33 KG/M2 | HEIGHT: 67 IN

## 2020-09-10 VITALS — DIASTOLIC BLOOD PRESSURE: 65 MMHG | SYSTOLIC BLOOD PRESSURE: 110 MMHG

## 2020-09-10 VITALS — SYSTOLIC BLOOD PRESSURE: 93 MMHG | DIASTOLIC BLOOD PRESSURE: 61 MMHG

## 2020-09-10 DIAGNOSIS — R62.7: ICD-10-CM

## 2020-09-10 DIAGNOSIS — N18.6: ICD-10-CM

## 2020-09-10 DIAGNOSIS — G93.41: ICD-10-CM

## 2020-09-10 DIAGNOSIS — E16.2: ICD-10-CM

## 2020-09-10 DIAGNOSIS — E86.0: ICD-10-CM

## 2020-09-10 DIAGNOSIS — K75.81: ICD-10-CM

## 2020-09-10 DIAGNOSIS — Z87.11: ICD-10-CM

## 2020-09-10 DIAGNOSIS — N17.0: Primary | ICD-10-CM

## 2020-09-10 DIAGNOSIS — I12.0: ICD-10-CM

## 2020-09-10 DIAGNOSIS — E87.2: ICD-10-CM

## 2020-09-10 DIAGNOSIS — Z66: ICD-10-CM

## 2020-09-10 DIAGNOSIS — J96.01: ICD-10-CM

## 2020-09-10 DIAGNOSIS — K72.00: ICD-10-CM

## 2020-09-10 DIAGNOSIS — K74.60: ICD-10-CM

## 2020-09-10 DIAGNOSIS — D69.6: ICD-10-CM

## 2020-09-10 DIAGNOSIS — K72.10: ICD-10-CM

## 2020-09-10 DIAGNOSIS — K75.4: ICD-10-CM

## 2020-09-10 LAB
<PLATELET ESTIMATE>: (no result)
<PLT MORPHOLOGY>: (no result)
ACANTHOCYTES BLD QL SMEAR: (no result)
ACETONE, SERUM: (no result)
ALBUMIN SERPL-MCNC: 2.6 G/DL (ref 3.4–5)
ALP SERPL-CCNC: 217 U/L (ref 45–117)
ALT SERPL-CCNC: 44 U/L (ref 12–78)
ANION GAP SERPL CALC-SCNC: 18 MMOL/L (ref 5–15)
ANISOCYTOSIS BLD QL SMEAR: (no result)
BAND#(MANUAL): 0.1 X10^3/UL
BILIRUB SERPL-MCNC: 9.2 MG/DL (ref 0.2–1)
BURR CELLS BLD QL SMEAR: (no result)
CALCIUM SERPL-MCNC: 8.8 MG/DL (ref 8.5–10.1)
CHLORIDE SERPL-SCNC: 107 MMOL/L (ref 98–107)
CREAT SERPL-MCNC: 5.47 MG/DL (ref 0.55–1.02)
ERYTHROCYTE [DISTWIDTH] IN BLOOD BY AUTOMATED COUNT: 26.1 % (ref 9.6–15.2)
LYMPH#(MANUAL): 0.51 X10^3/UL (ref 1–3.4)
LYMPHS% (MANUAL): 5 % (ref 22–44)
MACROCYTES BLD QL SMEAR: (no result)
MCH RBC QN AUTO: 31.5 PG (ref 27–34.8)
MCHC RBC AUTO-ENTMCNC: 33.2 G/DL (ref 32.4–35.8)
MCV RBC AUTO: 94.6 FL (ref 80–100)
MD: YES
MICROCYTES BLD QL SMEAR: (no result)
MICROSCOPIC: (no result)
MONOS#(MANUAL): 0.1 X10^3/UL (ref 0.3–2.7)
MONOS% (MANUAL): 1 % (ref 2–9)
NEUTS BAND NFR BLD: 1 % (ref 0–7)
OVALOCYTES BLD QL SMEAR: (no result)
PLATELET # BLD AUTO: 90 X10^3/UL (ref 130–400)
PMNS WITH VACUOLES: (no result)
PMV BLD AUTO: 6.9 FL (ref 7.4–10.4)
PROT SERPL-MCNC: 5.1 G/DL (ref 6.4–8.2)
RBC # BLD AUTO: 2.71 X10^6/UL (ref 3.82–5.3)
SCHISTOCYTES BLD QL SMEAR: (no result)
SEG#(MANUAL): 9.39 X10^3/UL (ref 1.8–6.8)
SEGS% (MANUAL): 93 % (ref 42–75)
TARGETS BLD QL SMEAR: (no result)

## 2020-09-10 PROCEDURE — 85025 COMPLETE CBC W/AUTO DIFF WBC: CPT

## 2020-09-10 PROCEDURE — 84100 ASSAY OF PHOSPHORUS: CPT

## 2020-09-10 PROCEDURE — 80053 COMPREHEN METABOLIC PANEL: CPT

## 2020-09-10 PROCEDURE — 87086 URINE CULTURE/COLONY COUNT: CPT

## 2020-09-10 PROCEDURE — 36415 COLL VENOUS BLD VENIPUNCTURE: CPT

## 2020-09-10 PROCEDURE — 83735 ASSAY OF MAGNESIUM: CPT

## 2020-09-10 PROCEDURE — 87106 FUNGI IDENTIFICATION YEAST: CPT

## 2020-09-10 PROCEDURE — 82962 GLUCOSE BLOOD TEST: CPT

## 2020-09-10 PROCEDURE — 80048 BASIC METABOLIC PNL TOTAL CA: CPT

## 2020-09-10 PROCEDURE — 82140 ASSAY OF AMMONIA: CPT

## 2020-09-10 PROCEDURE — 93005 ELECTROCARDIOGRAM TRACING: CPT

## 2020-09-10 PROCEDURE — 0T9B70Z DRAINAGE OF BLADDER WITH DRAINAGE DEVICE, VIA NATURAL OR ARTIFICIAL OPENING: ICD-10-PCS | Performed by: HOSPITALIST

## 2020-09-10 PROCEDURE — 81001 URINALYSIS AUTO W/SCOPE: CPT

## 2020-09-10 PROCEDURE — 82010 KETONE BODYS QUAN: CPT

## 2020-09-10 RX ADMIN — MIDODRINE HYDROCHLORIDE SCH MG: 5 TABLET ORAL at 20:15

## 2020-09-10 RX ADMIN — LACTULOSE SCH GM: 10 SOLUTION ORAL at 20:15

## 2020-09-10 RX ADMIN — MIDODRINE HYDROCHLORIDE SCH MG: 5 TABLET ORAL at 16:30

## 2020-09-10 RX ADMIN — DEXTROSE AND SODIUM CHLORIDE SCH MLS/HR: 5; .45 INJECTION, SOLUTION INTRAVENOUS at 17:23

## 2020-09-10 RX ADMIN — LACTULOSE SCH GM: 10 SOLUTION ORAL at 16:00

## 2020-09-10 NOTE — NUR
PT BIB EMS FOR INCREASED DETORIATION IN STATUS AND FAILURE TO THRIVE. PT END 
STAGE LIVER DISEASE. PT NOT ALERT, NODS YES OR NO QUESTIONS, HYPOTENSIVE 70/40, 
CARDIAC MONITOR APPLIED. MARQUES OUTPUT DARK IN COLOR. JAUNDICE SKIN COLOR. 4L O2 
APPLIED, 96%. 88% RA. DENIES PAIN, CP, SOB OR COUGH.

## 2020-09-11 VITALS — DIASTOLIC BLOOD PRESSURE: 55 MMHG | SYSTOLIC BLOOD PRESSURE: 77 MMHG

## 2020-09-11 VITALS — DIASTOLIC BLOOD PRESSURE: 52 MMHG | SYSTOLIC BLOOD PRESSURE: 101 MMHG

## 2020-09-11 VITALS — DIASTOLIC BLOOD PRESSURE: 56 MMHG | SYSTOLIC BLOOD PRESSURE: 90 MMHG

## 2020-09-11 LAB
<PLATELET ESTIMATE>: (no result)
<PLT MORPHOLOGY>: (no result)
ACANTHOCYTES BLD QL SMEAR: (no result)
ANION GAP SERPL CALC-SCNC: 17 MMOL/L (ref 5–15)
ANISOCYTOSIS BLD QL SMEAR: (no result)
BASOPHILS # BLD AUTO: 0.07 X10^3/UL (ref 0–0.1)
BASOPHILS NFR BLD AUTO: 1 % (ref 0–1)
BURR CELLS BLD QL SMEAR: (no result)
CALCIUM SERPL-MCNC: 8.6 MG/DL (ref 8.5–10.1)
CHLORIDE SERPL-SCNC: 108 MMOL/L (ref 98–107)
CREAT SERPL-MCNC: 5.59 MG/DL (ref 0.55–1.02)
EOSINOPHIL # BLD AUTO: 0.07 X10^3/UL (ref 0–0.4)
EOSINOPHIL NFR BLD AUTO: 1 % (ref 1–7)
ERYTHROCYTE [DISTWIDTH] IN BLOOD BY AUTOMATED COUNT: 27.5 % (ref 9.6–15.2)
LYMPHOCYTES # BLD AUTO: 1.62 X10^3/UL (ref 1–3.4)
LYMPHOCYTES NFR BLD AUTO: 15 % (ref 22–44)
MACROCYTES BLD QL SMEAR: (no result)
MCH RBC QN AUTO: 32.1 PG (ref 27–34.8)
MCHC RBC AUTO-ENTMCNC: 33.2 G/DL (ref 32.4–35.8)
MCV RBC AUTO: 96.7 FL (ref 80–100)
MD: (no result)
MICROCYTES BLD QL SMEAR: (no result)
MONOCYTES # BLD AUTO: 0.24 X10^3/UL (ref 0.2–0.8)
MONOCYTES NFR BLD AUTO: 2 % (ref 2–9)
NEUTROPHILS # BLD AUTO: 8.55 X10^3/UL (ref 1.8–6.8)
NEUTROPHILS NFR BLD AUTO: 81 % (ref 42–75)
OVALOCYTES BLD QL SMEAR: (no result)
PLATELET # BLD AUTO: 57 X10^3/UL (ref 130–400)
PMV BLD AUTO: 7.3 FL (ref 7.4–10.4)
POLYCHROMASIA BLD QL SMEAR: (no result)
RBC # BLD AUTO: 2.72 X10^6/UL (ref 3.82–5.3)
SCHISTOCYTES BLD QL SMEAR: (no result)
TARGETS BLD QL SMEAR: (no result)

## 2020-09-11 RX ADMIN — MIDODRINE HYDROCHLORIDE SCH MG: 5 TABLET ORAL at 09:33

## 2020-09-11 RX ADMIN — MIDODRINE HYDROCHLORIDE SCH MG: 5 TABLET ORAL at 14:35

## 2020-09-11 RX ADMIN — DEXTROSE AND SODIUM CHLORIDE SCH MLS/HR: 5; .45 INJECTION, SOLUTION INTRAVENOUS at 02:50

## 2020-09-11 RX ADMIN — LACTULOSE SCH GM: 10 SOLUTION ORAL at 14:34

## 2020-09-11 RX ADMIN — MORPHINE SULFATE PRN MG: 1 INJECTION INTRAVENOUS at 16:16

## 2020-09-11 RX ADMIN — LACTULOSE SCH GM: 10 SOLUTION ORAL at 08:20

## 2020-09-12 RX ADMIN — MORPHINE SULFATE PRN MG: 1 INJECTION INTRAVENOUS at 06:43

## 2020-09-12 RX ADMIN — ATROPINE SULFATE PRN DROP: 1 SOLUTION OPHTHALMIC at 20:08

## 2020-09-12 RX ADMIN — ATROPINE SULFATE PRN DROP: 1 SOLUTION OPHTHALMIC at 07:27

## 2020-09-12 RX ADMIN — ATROPINE SULFATE PRN DROP: 1 SOLUTION OPHTHALMIC at 06:43

## 2020-09-12 RX ADMIN — MORPHINE SULFATE PRN MG: 1 INJECTION INTRAVENOUS at 21:25

## 2020-09-12 RX ADMIN — ATROPINE SULFATE PRN DROP: 1 SOLUTION OPHTHALMIC at 02:55

## 2020-09-13 RX ADMIN — ATROPINE SULFATE PRN DROP: 1 SOLUTION OPHTHALMIC at 04:13

## 2020-09-13 RX ADMIN — ATROPINE SULFATE PRN DROP: 1 SOLUTION OPHTHALMIC at 14:51

## 2020-09-13 RX ADMIN — ATROPINE SULFATE PRN DROP: 1 SOLUTION OPHTHALMIC at 07:38

## 2020-09-13 RX ADMIN — MORPHINE SULFATE PRN MG: 1 INJECTION INTRAVENOUS at 11:35

## 2020-09-14 RX ADMIN — MORPHINE SULFATE PRN MG: 1 INJECTION INTRAVENOUS at 02:50

## 2020-09-14 RX ADMIN — ATROPINE SULFATE PRN DROP: 1 SOLUTION OPHTHALMIC at 05:34

## (undated) DEVICE — PROTECTOR ULNA NERVE - (36PR/CA)

## (undated) DEVICE — MASK ANESTHESIA ADULT  - (100/CA)

## (undated) DEVICE — FILM CASSETTE ENDO

## (undated) DEVICE — BITE BLOCK ADULT 60FR (100EA/CA)

## (undated) DEVICE — CONTAINER, SPECIMEN, STERILE

## (undated) DEVICE — SYRINGE DISP. 12 CC LL - (100/BX)

## (undated) DEVICE — KIT CUSTOM PROCEDURE SINGLE FOR ENDO  (15/CA)

## (undated) DEVICE — SUCTION INSTRUMENT YANKAUER BULBOUS TIP W/O VENT (50EA/CA)

## (undated) DEVICE — NEPTUNE 4 PORT MANIFOLD - (20/PK)

## (undated) DEVICE — SPONGE GAUZE NON-STERILE 4X4 - (2000/CA 10PK/CA)

## (undated) DEVICE — HEAD HOLDER JUNIOR/ADULT

## (undated) DEVICE — KIT ANESTHESIA W/CIRCUIT & 3/LT BAG W/FILTER (20EA/CA)

## (undated) DEVICE — TUBE E-T HI-LO CUFF 7.0MM (10EA/PK)